# Patient Record
Sex: MALE | Race: WHITE | NOT HISPANIC OR LATINO | Employment: OTHER | URBAN - METROPOLITAN AREA
[De-identification: names, ages, dates, MRNs, and addresses within clinical notes are randomized per-mention and may not be internally consistent; named-entity substitution may affect disease eponyms.]

---

## 2022-06-03 ENCOUNTER — PATIENT OUTREACH (OUTPATIENT)
Dept: UROLOGY | Facility: CLINIC | Age: 82
End: 2022-06-03

## 2022-06-03 ENCOUNTER — TELEPHONE (OUTPATIENT)
Dept: UROLOGY | Facility: CLINIC | Age: 82
End: 2022-06-03

## 2022-06-03 NOTE — TELEPHONE ENCOUNTER
Pt called to discuss possible appt and urgency and records. No answer on mobile, no voicemail set up. Voicemail left on home phone with request for return call

## 2022-06-03 NOTE — TELEPHONE ENCOUNTER
[facility] Call Center    Phone Message    May a detailed message be left on voicemail: yes     Reason for Call: Patient is being sent from Dr. Beckwith at [facility] for HoLEP. Patient says unable to do a video visit at all. Dr. Castrejon does not see in person for this. Offered in person with Dr. Lay in September  but patient says he does not want to wait that long and needs to be seen this month. Please reach out to discuss options. Thank you!    Action Taken: Message routed to:  [facility] ([facility]    Travel Screening: Not Applicable

## 2022-06-03 NOTE — TELEPHONE ENCOUNTER
[facility] will update records and reach out to patient after urgency has been decided. As of now, we have no information to suggest urgent work-in.

## 2022-06-03 NOTE — TELEPHONE ENCOUNTER
Pt has been to ED four times in the month of June, is asking for a holep consult from [facility] urologist. Appt booked

## 2022-06-07 ENCOUNTER — PRE VISIT (OUTPATIENT)
Dept: UROLOGY | Facility: CLINIC | Age: 82
End: 2022-06-07

## 2022-06-07 NOTE — TELEPHONE ENCOUNTER
Action 2022 JTV 3:48 PM    Action Taken CSS called patient. Patient did not  and call went directly to [employer] Receive.     CSS sent an urgent request to MACKEY and Jean for images to be pushed.      Action 2022 JTV 8:41 AM   Action Taken CSS received and resolved images from Jean and [facility].         MEDICAL RECORDS REQUEST   [facility]  Urology Clinic  [address] [place] [id number]  PHONE: [phone]  Fax: [phone]        FUTURE VISIT INFORMATION                                                   Kehinde Portillo, : 1940 scheduled for future visit at [facility]    APPOINTMENT INFORMATION:    Date: 2022    Provider:  Isaiah Castrejon MD    Reason for Visit/Diagnosis: holep consult - aguayo in place      RECORDS REQUESTED FOR VISIT                                                     NOTES  STATUS/DETAILS   OFFICE NOTE from other specialist  yes, 2022 -- YUMIKO Kyle M.D.  -- [facility]    2022 -- Nemesio Molina M.D.  -- [facility]    2022, 2022 --  Jo-Ann De Paz P.A.-C., M.S.   DISCHARGE SUMMARY from hospital   yes, 2022 -- Tracey Easley MD -- [facility]      DISCHARGE REPORT from the ER   yes, 2022 -- Lionel Barbosa MD -- [facility]   2022 -- Gabi Parks MD-- [facility]   OPERATIVE REPORT  yes   MEDICATION LIST  yes   LABS     URINALYSIS (UA)  yes   IMAGING (IMAGES & REPORT)  yes, 2022 -- BLADDER Image -- [facility]  [id number] CT Urogram -- [facility]  2022, 2021 -- CT Chest -- [facility]  2022, 2021 -- CT ABD PELVIS -- [facility]    2022 -- US RENAL and Bladder -- [facility]   PSA (LAB)  yes     PRE-VISIT CHECKLIST      Record collection complete yes   Appointment appropriately scheduled           (right time/right provider) Yes   Joint diagnostic appointment coordinated correctly          (ensure right order & amount of time)  Yes   MyChart activation No   Questionnaire complete If no, please explain pending

## 2022-06-17 ENCOUNTER — PRE VISIT (OUTPATIENT)
Dept: UROLOGY | Facility: CLINIC | Age: 82
End: 2022-06-17

## 2022-06-17 NOTE — TELEPHONE ENCOUNTER
Reason for visit: Consult to discuss HoLEP     Dx/Hx/Sx: Urinary Retention / BPH    Records/imaging/labs/orders: In EPIC    At Rooming: standard (no AUA, patient has aguayo in place)

## 2022-06-21 ENCOUNTER — TELEPHONE (OUTPATIENT)
Dept: UROLOGY | Facility: CLINIC | Age: 82
End: 2022-06-21
Payer: MEDICARE

## 2022-06-21 ENCOUNTER — OFFICE VISIT (OUTPATIENT)
Dept: UROLOGY | Facility: CLINIC | Age: 82
End: 2022-06-21
Payer: MEDICARE

## 2022-06-21 VITALS
WEIGHT: 150 LBS | SYSTOLIC BLOOD PRESSURE: 123 MMHG | HEART RATE: 69 BPM | DIASTOLIC BLOOD PRESSURE: 73 MMHG | BODY MASS INDEX: 22.73 KG/M2 | HEIGHT: 68 IN

## 2022-06-21 DIAGNOSIS — R33.9 URINARY RETENTION: ICD-10-CM

## 2022-06-21 DIAGNOSIS — N39.498 OTHER URINARY INCONTINENCE: Primary | ICD-10-CM

## 2022-06-21 PROCEDURE — 51798 US URINE CAPACITY MEASURE: CPT | Performed by: UROLOGY

## 2022-06-21 PROCEDURE — 99205 OFFICE O/P NEW HI 60 MIN: CPT | Mod: 25 | Performed by: UROLOGY

## 2022-06-21 RX ORDER — EPLERENONE 50 MG/1
50 TABLET, FILM COATED ORAL DAILY
COMMUNITY
Start: 2022-05-21

## 2022-06-21 RX ORDER — AMLODIPINE BESYLATE 5 MG/1
5 TABLET ORAL DAILY
COMMUNITY
Start: 2022-05-21

## 2022-06-21 RX ORDER — AMOXICILLIN 500 MG/1
2000 CAPSULE ORAL ONCE
COMMUNITY
Start: 2022-03-15

## 2022-06-21 RX ORDER — TERAZOSIN 10 MG/1
CAPSULE ORAL
COMMUNITY
Start: 2022-05-21

## 2022-06-21 RX ORDER — DEXAMETHASONE 4 MG/1
TABLET ORAL
COMMUNITY
Start: 2022-04-06

## 2022-06-21 RX ORDER — MULTIVITAMIN,THER AND MINERALS
1 TABLET ORAL DAILY
COMMUNITY

## 2022-06-21 RX ORDER — FLUTICASONE PROPIONATE 50 MCG
2 SPRAY, SUSPENSION (ML) NASAL 2 TIMES DAILY
COMMUNITY

## 2022-06-21 RX ORDER — ATORVASTATIN CALCIUM 20 MG/1
TABLET, FILM COATED ORAL
COMMUNITY
Start: 2022-05-21

## 2022-06-21 RX ORDER — CIPROFLOXACIN AND DEXAMETHASONE 3; 1 MG/ML; MG/ML
SUSPENSION/ DROPS AURICULAR (OTIC)
Status: ON HOLD | COMMUNITY
Start: 2022-05-04 | End: 2022-09-13

## 2022-06-21 ASSESSMENT — PAIN SCALES - GENERAL: PAINLEVEL: NO PAIN (0)

## 2022-06-21 NOTE — LETTER
Date:June 27, 2022      Provider requested that no letter be sent. Do not send.       [facility]

## 2022-06-21 NOTE — LETTER
6/21/2022       RE: Kehinde Portillo  [address] [address] [facility] [address]     Dear Colleague,    Thank you for referring your patient, Kehinde Portillo, to the [facility] [place] [facility]. Please see a copy of my visit note below.          UROLOGY OUTPATIENT VISIT      Chief Complaint:   Urinary Retention      Synopsis    Kehinde Portillo is a very pleasant AGE: 82 year old year old person    He is being referred from [place] for consideration of HoLEP,  He underwent a cysto 2 months ago resulting in a UTI and hospitalization which led to retention  He is no longer in retention, he knows how to CIC but has not had to cath  Main symptoms at the moment are frequency and urgency  He voids 4x per night  He describes the stream as adequate    AUA Score today 30/4    He does have a history of CLL but this is well controlled     He is a very functional person, highly mobile and active  for age           Medications     Current Outpatient Medications   Medication     amLODIPine (NORVASC) 5 MG tablet     amoxicillin (AMOXIL) 500 MG capsule     atorvastatin (LIPITOR) 20 MG tablet     ciprofloxacin-dexamethasone (CIPRODEX) 0.3-0.1 % otic suspension     eplerenone (INSPRA) 50 MG tablet     FLOVENT  MCG/ACT inhaler     fluticasone (FLONASE) 50 MCG/ACT nasal spray     Multiple Vitamins-Minerals (SUPER THERA CARLY M) TABS     terazosin (HYTRIN) 10 MG capsule     vitamin D3 (CHOLECALCIFEROL) 125 MCG (5000 UT) tablet     No current facility-administered medications for this visit.         The following  distinct labs were reviewed    I personally reviewed all applicable laboratory data and went over findings with patient  Significant for:    6/17/22  Hgb 12.3    PLatelets 176    Creatinine 1    Ucx 5/23 enterococcus        Recent Imaging Report    I personally reviewed all applicable imaging and went over the below findings with  patient.    Renal US 5/2022  1.  Mild right hydronephrosis.   2.  Increased renal parenchymal echogenicity suggesting chronic medical renal disease.   3.  Mild right hydronephrosis.   4.  Bladder is decompressed with a Wallace catheter reducing evaluation for wall thickening.   5.  Bilateral renal cysts. No follow-up needed.      CTU 5/2022  1. Markedly enlarged heterogeneously enhancing prostate with secondary changes of bladder outlet   obstruction.   2. Tiny calyceal tip stone in the right kidney.   3. Mild abdominopelvic lymphadenopathy related to patient's history of chronic lymphocytic leukemia   stable to slightly improved since 2/24/2022. Stable mild splenomegaly.        Personal Review of Recent Imaging    I personally reviewed the above CT and based on my own interpretation prostate is 6.5 x 6.0 x 7 cm for estimated volume around 135 mL           Assessment/Plan   82 year old year old person with large gland BPH, hx of urinary retention  After discussion of medical and surgical treatments for BPH including alpha blockers, anticholinergics, MIST, PAE, transurethral resection, laser ablation, enucleation and simple prostatectomy, Mr. Portillo has decided to proceed with Holmium Laser Enucleation of the Prostate (HoLEP).    We discussed the associated risks of this procedure included but not limited to the following:  -Bleeding, potentially significant enough to require clot evacuation and blood transfusion  -Infection, for which we will plan to treat preoperatively based on targeted antibiotic therapy  -Damage to the bladder, urethra and penis including the risk of urethral stricture and bladder neck contracture  -Risk of incidentally discovered prostate cancer.  We discussed that this would not preclude him from further therapy though it could prolong recovery and potentially increase risk of complications associated with cancer treatment.  Further preoperative workup to assess for prostate cancer prior to  surgery was offered but patient deferred.  -Risk of retrograde ejaculation which would be expected to occur in the majority if not all men after HoLEP  -Risk of urinary incontinence.  We discussed that in the majority of men this is a transient process that is generally self limited to the first 6-12 weeks after surgery though could take longer to resolve depending on baseline bladder instability.  -Risk of postperative urinary retention though in published series HoLEP has been found to be associated with high success rates of achieving spontaneous voiding even in men with underactive and atonic bladders.  -We will proceed with preoperative clearance with preference to minimize all anticoagulation as deemed acceptable by his primary care provider.       Complex Medical Decision Making: Yes.  Immunocomprimised States (Cancer, Diabetes, Steroid Use, HIV/Aids)  Discussed with patient the higher potential risk of perioperative and post-procedural complications as a result of comorbid illnesses and   Discussed with patient the higher potential risk of prolonged post-operative recovery as a result of comorbid illnesses    CC:  No primary care provider on file.    >45 minutes spent exclusive to any listed procedure on the clinical encounter date doing chart review, history and exam, documentation and further activities as noted above            Again, thank you for allowing me to participate in the care of your patient.      Sincerely,    Isaiah Castrejon MD

## 2022-06-21 NOTE — NURSING NOTE
"Chief Complaint   Patient presents with     Consult     Discuss HoLEP       Blood pressure 123/73, pulse 69, height 1.727 m (5' 8\"), weight 68 kg (150 lb). Body mass index is 22.81 kg/m .    There is no problem list on file for this patient.      Allergies   Allergen Reactions     Aspirin Other (See Comments)     SOB  SOB         Current Outpatient Medications   Medication Sig Dispense Refill     amLODIPine (NORVASC) 5 MG tablet Take 5 mg by mouth daily       amoxicillin (AMOXIL) 500 MG capsule TAKE 4 CAPSULES BY MOUTH ONE HOUR PRIOR TO APPOINTMENT       atorvastatin (LIPITOR) 20 MG tablet TAKE 1 TABLET (20 MG) BY MOUTH ONCE DAILY WITH EVENING MEAL.       ciprofloxacin-dexamethasone (CIPRODEX) 0.3-0.1 % otic suspension INSTILL 4 DROPS INTO AFFECTED EAR 2 TIMES DAILY FOR 5 DAYS 7       eplerenone (INSPRA) 50 MG tablet Take 50 mg by mouth daily       FLOVENT  MCG/ACT inhaler TAKE 2 PUFFS BY MOUTH TWICE A DAY       fluticasone (FLONASE) 50 MCG/ACT nasal spray Spray 2 sprays in nostril       Multiple Vitamins-Minerals (SUPER THERA CARLY M) TABS Take 1 tablet by mouth       terazosin (HYTRIN) 10 MG capsule TAKE 1 CAPSULE (10 MG) BY MOUTH BEFORE BEDTIME.       vitamin D3 (CHOLECALCIFEROL) 125 MCG (5000 UT) tablet Take 5,000 Units by mouth         Social History     Tobacco Use     Smoking status: Never Smoker     Smokeless tobacco: Never Used       Fidencio Malone, EMT  6/21/2022  3:16 PM  "

## 2022-06-21 NOTE — PROGRESS NOTES
UROLOGY OUTPATIENT VISIT      Chief Complaint:   Urinary Retention      Synopsis    Kehinde Portillo is a very pleasant AGE: 82 year old year old person    He is being referred from [place] for consideration of HoLEP,  He underwent a cysto 2 months ago resulting in a UTI and hospitalization which led to retention  He is no longer in retention, he knows how to CIC but has not had to cath  Main symptoms at the moment are frequency and urgency  He voids 4x per night  He describes the stream as adequate    AUA Score today 30/4    He does have a history of CLL but this is well controlled     He is a very functional person, highly mobile and active  for age           Medications     Current Outpatient Medications   Medication     amLODIPine (NORVASC) 5 MG tablet     amoxicillin (AMOXIL) 500 MG capsule     atorvastatin (LIPITOR) 20 MG tablet     ciprofloxacin-dexamethasone (CIPRODEX) 0.3-0.1 % otic suspension     eplerenone (INSPRA) 50 MG tablet     FLOVENT  MCG/ACT inhaler     fluticasone (FLONASE) 50 MCG/ACT nasal spray     Multiple Vitamins-Minerals (SUPER THERA CARLY M) TABS     terazosin (HYTRIN) 10 MG capsule     vitamin D3 (CHOLECALCIFEROL) 125 MCG (5000 UT) tablet     No current facility-administered medications for this visit.         The following  distinct labs were reviewed    I personally reviewed all applicable laboratory data and went over findings with patient  Significant for:    6/17/22  Hgb 12.3    PLatelets 176    Creatinine 1    Ucx 5/23 enterococcus        Recent Imaging Report    I personally reviewed all applicable imaging and went over the below findings with patient.    Renal US 5/2022  1.  Mild right hydronephrosis.   2.  Increased renal parenchymal echogenicity suggesting chronic medical renal disease.   3.  Mild right hydronephrosis.   4.  Bladder is decompressed with a Wallace catheter reducing evaluation for wall thickening.   5.  Bilateral renal cysts. No follow-up needed.      CTU  5/2022  1. Markedly enlarged heterogeneously enhancing prostate with secondary changes of bladder outlet   obstruction.   2. Tiny calyceal tip stone in the right kidney.   3. Mild abdominopelvic lymphadenopathy related to patient's history of chronic lymphocytic leukemia   stable to slightly improved since 2/24/2022. Stable mild splenomegaly.        Personal Review of Recent Imaging    I personally reviewed the above CT and based on my own interpretation prostate is 6.5 x 6.0 x 7 cm for estimated volume around 135 mL           Assessment/Plan   82 year old year old person with large gland BPH, hx of urinary retention  After discussion of medical and surgical treatments for BPH including alpha blockers, anticholinergics, MIST, PAE, transurethral resection, laser ablation, enucleation and simple prostatectomy, Mr. Portillo has decided to proceed with Holmium Laser Enucleation of the Prostate (HoLEP).    We discussed the associated risks of this procedure included but not limited to the following:  -Bleeding, potentially significant enough to require clot evacuation and blood transfusion  -Infection, for which we will plan to treat preoperatively based on targeted antibiotic therapy  -Damage to the bladder, urethra and penis including the risk of urethral stricture and bladder neck contracture  -Risk of incidentally discovered prostate cancer.  We discussed that this would not preclude him from further therapy though it could prolong recovery and potentially increase risk of complications associated with cancer treatment.  Further preoperative workup to assess for prostate cancer prior to surgery was offered but patient deferred.  -Risk of retrograde ejaculation which would be expected to occur in the majority if not all men after HoLEP  -Risk of urinary incontinence.  We discussed that in the majority of men this is a transient process that is generally self limited to the first 6-12 weeks after surgery though could take  longer to resolve depending on baseline bladder instability.  -Risk of postperative urinary retention though in published series HoLEP has been found to be associated with high success rates of achieving spontaneous voiding even in men with underactive and atonic bladders.  -We will proceed with preoperative clearance with preference to minimize all anticoagulation as deemed acceptable by his primary care provider.       Complex Medical Decision Making: Yes.  Immunocomprimised States (Cancer, Diabetes, Steroid Use, HIV/Aids)  Discussed with patient the higher potential risk of perioperative and post-procedural complications as a result of comorbid illnesses and   Discussed with patient the higher potential risk of prolonged post-operative recovery as a result of comorbid illnesses    CC:  No primary care provider on file.    >45 minutes spent exclusive to any listed procedure on the clinical encounter date doing chart review, history and exam, documentation and further activities as noted above

## 2022-06-27 ENCOUNTER — TELEPHONE (OUTPATIENT)
Dept: UROLOGY | Facility: CLINIC | Age: 82
End: 2022-06-27

## 2022-06-28 ENCOUNTER — TELEPHONE (OUTPATIENT)
Dept: UROLOGY | Facility: CLINIC | Age: 82
End: 2022-06-28

## 2022-06-28 DIAGNOSIS — R33.9 URINARY RETENTION: Primary | ICD-10-CM

## 2022-06-28 NOTE — TELEPHONE ENCOUNTER
Spoke with: Patient and Patient wife      Date of surgery: Monday Sept 12 2022  730am      Location: [facility]       Informed patient they will need a adult : Yes      Pre op with provider: patient will set up pre op end of August with Dr Jose Armando Mullen at [facility]       H&P Scheduled in PAC- NA         Pre procedure covid : PCR covid Friday Sept 9th 2022 at  [facility]       Additional imaging: NA        Surgery Packet :Forward to Katie       Additional comments: Please call patient with surgery teaching.

## 2022-07-19 ENCOUNTER — THERAPY VISIT (OUTPATIENT)
Dept: PHYSICAL THERAPY | Facility: CLINIC | Age: 82
End: 2022-07-19
Attending: UROLOGY
Payer: MEDICARE

## 2022-07-19 DIAGNOSIS — N40.0 ENLARGED PROSTATE: ICD-10-CM

## 2022-07-19 DIAGNOSIS — N39.41 URGENCY INCONTINENCE: ICD-10-CM

## 2022-07-19 DIAGNOSIS — R35.0 URINARY FREQUENCY: ICD-10-CM

## 2022-07-19 PROCEDURE — 97535 SELF CARE MNGMENT TRAINING: CPT | Mod: GP | Performed by: PHYSICAL THERAPIST

## 2022-07-19 PROCEDURE — 97110 THERAPEUTIC EXERCISES: CPT | Mod: GP | Performed by: PHYSICAL THERAPIST

## 2022-07-19 PROCEDURE — 97161 PT EVAL LOW COMPLEX 20 MIN: CPT | Mod: GP | Performed by: PHYSICAL THERAPIST

## 2022-07-19 NOTE — PROGRESS NOTES
[facility]    OUTPATIENT Physical Therapy ORTHOPEDIC EVALUATION  PLAN OF TREATMENT FOR OUTPATIENT REHABILITATION  (COMPLETE FOR INITIAL CLAIMS ONLY)  Patient's Last Name, First Name, M.I.  YOB: 1940  Kehinde Portillo    Provider s Name:  [facility]   Medical Record No.  [id number]   Start of Care Date:  07/19/22   Onset Date:   06/21/22 (MD order for PT)   Type:     _X__PT   ___OT Medical Diagnosis:    Encounter Diagnoses   Name Primary?    Urinary frequency     Urgency incontinence     Enlarged prostate         Treatment Diagnosis:  enlarged prostate, urinary frequency/urgency, urge incontinence        Goals:     07/19/22 0700   Urinary Leakage   Previous Functional Level No problems   Current Functional Level Number of urinary leakage episodes in a month;Leaking with urge   Performance Level 2-3   STG Target Performance Decrease urinary leakage episodes in a month to   Performance Level 1   Rationale continence throughout the day   Due Date 08/30/22   LTG Target Performance Decrease urinary leakage episodes in a month to   Performance Level 0   Rationale continence throughout the day   Due Date 09/28/22   Voiding Patterns   Previous Functional Level No problems   Current Functional Level Times a day that urgency is experienced;Times during the night that patient voids   Peformance level day: every 1-3 hrs night: 3-4 voids   STG Target Performance Reduce times in a day urgency is experienced to;Reduce times a night that patient voids to    Performance Level day: every 1.5-3 hrs night: 2-3   Rationale to establish restorative sleep pattern;work toward normal voiding intervals to focus on ADLS, work, or school;continence throughout the day   Due Date 08/16/22   LTG Target Performance Reduce times in a day urgency is experienced to;Reduce times a night that patient  voids to    Performance Level day: 2-3 hrs night: 1-2   Rationale continence throughout the day;to establish restorative sleep pattern;work toward normal voiding intervals to focus on ADLS, work, or school   Due Date 09/13/22       Therapy Frequency:  1x weekly for 1 week then 1x monthly for  Predicted Duration of Therapy Intervention:  1 month    Lolis Bueno, PT                 I CERTIFY THE NEED FOR THESE SERVICES FURNISHED UNDER        THIS PLAN OF TREATMENT AND WHILE UNDER MY CARE     (Physician attestation of this document indicates review and certification of the therapy plan).                     Certification Date From:  07/19/22   Certification Date To:  10/16/22    Referring Provider:  Isaiah Castrejon    Initial Assessment        See Epic Evaluation SOC Date: 07/19/22

## 2022-07-19 NOTE — PROGRESS NOTES
Physical Therapy Initial Evaluation  Subjective:  The history is provided by the patient. No  was used.   Patient Health History  Kehinde A Suchy being seen for enlarged prostate, urinary frequency/urgency.     Date of Onset: 6/21/22 MD order for PT.   Problem occurred: reports having an enlarged prostate for many years . He had a cystoscopy in 5/2022 with an UTI following it which then required a catheter for 3 weeks. Currently patient reports frequent urination( 3 voids at night, every 1-3 hrs in day) occasional urge incontinence( 2-3x per month) Pt will have Holmium laser prostate surgery in 9/2022 and advised to do PT prior to learn to do the pelvic floor exercises.    Pain is reported as 0/10 on pain scale.  General health as reported by patient is good.  Pertinent medical history includes: cancer, high blood pressure, migraines/headaches and other (CLL).   Red flags:  None as reported by patient.  Medical allergies: other. Other medical allergies details: aspirin .   Surgeries include:  Orthopedic surgery. Other surgery history details: B tKA 2013 2016, appendix 1996, 2 hernia 2012.    Current medications:  High blood pressure medication.    Current occupation is rettired .                     Therapist Generated HPI Evaluation         Type of problem:  Other and incontinence (enlarged prostate, frequency/ urgency).    This is a chronic condition.  Condition occurred with:  Insidious onset.          Since onset symptoms are unchanged.  Symptoms are exacerbated by other (waiting too long to void increases urgency and occasional urge incontinence  )  and relieved by other (trying not to wait to long to void).      Barriers include:  None as reported by patient.                        Objective:  System                                 Pelvic Dysfunction Evaluation:    Bladder/Pelvic Problems:    Storage Problem:  Urgency, frequency, urge incontinence and nocturia  Emptying Problem:   Incomplete emptying and hesitancy      Diagnostic Tests:      UA/Urine Sample:  UTI 5/2022 following a cystoscopy         Cystoscopy:  5/2022              Abdominal Wall:  Abdominal wall pelvic: good TrA activation in supine with verbal cues.  Diastasis Recti:  Normal            External Assessment:  External assessment pelvic: strong PFMC observed in supine, 8-10 sec hold, repeateable with good relaxation phase and no substitution.  Skin Condition:  Normal      Tissue Symmetry:  Normal    Muscle Contraction/Perineal Mobility:  Elevation and urogential triangle descent    Additional History:        Caffeine Consumption:  16 oz                     General     ROS    Assessment/Plan:    Patient is a 82 year old male with pelvic complaints.    Patient has the following significant findings with corresponding treatment plan.                Diagnosis 1:  Enlarged prostate, urinary frequency/urgency, incontinence  Impaired muscle performance - neuro re-education and home program  Decreased function - therapeutic activities and home program    Therapy Evaluation Codes:   1) History comprised of:   Personal factors that impact the plan of care:      None.    Comorbidity factors that impact the plan of care are:      None.     Medications impacting care: None.  2) Examination of Body Systems comprised of:   Body structures and functions that impact the plan of care:      Pelvis.   Activity limitations that impact the plan of care are:      Frequency, Urgency and Urinary incontinence.  3) Clinical presentation characteristics are:   Stable/Uncomplicated.  4) Decision-Making    Low complexity using standardized patient assessment instrument and/or measureable assessment of functional outcome.  Cumulative Therapy Evaluation is: Low complexity.    Previous and current functional limitations:  (See Goal Flow Sheet for this information)    Short term and Long term goals: (See Goal Flow Sheet for this information)     Communication  ability:  Patient appears to be able to clearly communicate and understand verbal and written communication and follow directions correctly.  Treatment Explanation - The following has been discussed with the patient:   RX ordered/plan of care  Anticipated outcomes  Possible risks and side effects  This patient would benefit from PT intervention to resume normal activities.   Rehab potential is good.    Frequency:  1 X week, once daily  Duration:  for 1 weeks then 1x month for 1 month  Discharge Plan:  Achieve all LTG.  Independent in home treatment program.  Reach maximal therapeutic benefit.    Please refer to the daily flowsheet for treatment today, total treatment time and time spent performing 1:1 timed codes.

## 2022-08-15 ENCOUNTER — THERAPY VISIT (OUTPATIENT)
Dept: PHYSICAL THERAPY | Facility: CLINIC | Age: 82
End: 2022-08-15
Payer: MEDICARE

## 2022-08-15 DIAGNOSIS — R35.0 URINARY FREQUENCY: Primary | ICD-10-CM

## 2022-08-15 DIAGNOSIS — N40.0 ENLARGED PROSTATE: ICD-10-CM

## 2022-08-15 DIAGNOSIS — N39.41 URGENCY INCONTINENCE: ICD-10-CM

## 2022-08-15 PROCEDURE — 97110 THERAPEUTIC EXERCISES: CPT | Mod: GP | Performed by: PHYSICAL THERAPIST

## 2022-08-15 PROCEDURE — 97530 THERAPEUTIC ACTIVITIES: CPT | Mod: GP | Performed by: PHYSICAL THERAPIST

## 2022-08-16 ENCOUNTER — PATIENT OUTREACH (OUTPATIENT)
Dept: UROLOGY | Facility: CLINIC | Age: 82
End: 2022-08-16

## 2022-08-16 DIAGNOSIS — Z11.59 ENCOUNTER FOR SCREENING FOR OTHER VIRAL DISEASES: ICD-10-CM

## 2022-08-16 DIAGNOSIS — R33.9 URINARY RETENTION: Primary | ICD-10-CM

## 2022-08-16 NOTE — TELEPHONE ENCOUNTER
Pt has pre-op and covid scheduled, will need additional UC lab scheduled    Left detailed voicemail to return call to discuss upcoming surgery

## 2022-08-17 ENCOUNTER — TELEPHONE (OUTPATIENT)
Dept: UROLOGY | Facility: CLINIC | Age: 82
End: 2022-08-17

## 2022-08-17 NOTE — TELEPHONE ENCOUNTER
----- Message from Irma Rasheed RN sent at 8/17/2022  2:32 PM CDT -----  Please fax covid order to [facility] at fax number [phone]    Thanks!

## 2022-08-17 NOTE — TELEPHONE ENCOUNTER
Pt called and notified of the below. Pt expressed understanding.    Sarah Telles CMA  08/17/22  4:04 PM          ----- Message from Irma Rasheed RN sent at 8/17/2022  2:47 PM CDT -----  Would you call this patient and let him know that [facility] in [place] said he can add his covid test on to his already scheduled lab tests and that we have faxed an order over to them     Thanks  Irma

## 2022-08-17 NOTE — TELEPHONE ENCOUNTER
Action 08/17/22 MMT   Action Taken  CSS faxed orders to [facility] at [phone] per pt request.

## 2022-08-31 ENCOUNTER — TELEPHONE (OUTPATIENT)
Dept: UROLOGY | Facility: CLINIC | Age: 82
End: 2022-08-31

## 2022-08-31 NOTE — TELEPHONE ENCOUNTER
Pre Op Teaching Flowsheet       Pre and Post op Patient Education  Relevant Diagnosis:  bph   Surgical procedure:  holep  Teaching Topic:  Pre and post op teaching  Person Involved in teaching: Yes    Motivation Level:  Asks Questions: Yes  Eager to Learn: Yes  Cooperative: Yes  Receptive (willing/able to accept information):  Yes    Patient demonstrates understanding of the following:  Date of surgery:  9/12  Location of surgery:  3rd [facility]  History and Physical and any other testing necessary prior to surgery: Yes  Required time line for completion of History and Physical and any pre-op testing: Yes    Patient demonstrates understanding of the following:  Pre-op bowel prep:  N/A  Pre-op showering/scrub information with PCMX Soap: Yes  Blood thinner medications discussed and when to stop (if applicable):  Yes  Discussed no visitor's at this time due to increase Covid-19 cases and how we need to make sure everyone stays safe.    Infection Prevention:   Patient demonstrates understanding of the following:  Surgical procedure site care taught: Yes  Signs and symptoms of infection taught: Yes      Post-op follow-up:  Discussed how to contact the hospital, nurse, and clinic scheduling staff if necessary. (See packet information)    Instructional materials used/given/mailed:  [place] [facility] Packet, post op teaching sheet, Map, Soap, and with the arrival/location information to come closer to the surgery date.    Surgical instructions packet given to patient in office:  N/A    Follow up: Discussed arranging for someone to drive you home. ( No public transportation)  Someone needed to stay the first twenty hours after surgery: Yes     referral: no     home:  yes    Care Giver:  yes    PCP:  yes

## 2022-09-08 ENCOUNTER — TELEPHONE (OUTPATIENT)
Dept: UROLOGY | Facility: CLINIC | Age: 82
End: 2022-09-08

## 2022-09-08 NOTE — TELEPHONE ENCOUNTER
Care Everywhere updated.    Message routed to RN CC    Results are:    CULTURE  <10,000 CFU/mL multiple organisms    Resulting Agency [facility]   Specimen Collected: 09/06/22 12:10 PM Last Resulted: 09/07/22  5:09 PM   Received From: [employer] [facility]  Result Received: 09/08/22  9:31 AM     Sarah Telles CMA  09/08/22  12:08 PM      ----- Message from Irma Rasheed RN sent at 9/6/2022  9:47 AM CDT -----  Regarding: UC  Please check for neg UC about 10 days out. If pos, please send note to myself    Irma

## 2022-09-09 ENCOUNTER — ANESTHESIA EVENT (OUTPATIENT)
Dept: SURGERY | Facility: CLINIC | Age: 82
End: 2022-09-09
Payer: MEDICARE

## 2022-09-10 ENCOUNTER — LAB (OUTPATIENT)
Dept: LAB | Facility: CLINIC | Age: 82
End: 2022-09-10
Payer: MEDICARE

## 2022-09-10 DIAGNOSIS — Z11.59 ENCOUNTER FOR SCREENING FOR OTHER VIRAL DISEASES: ICD-10-CM

## 2022-09-10 DIAGNOSIS — R33.9 URINARY RETENTION: ICD-10-CM

## 2022-09-10 PROCEDURE — U0003 INFECTIOUS AGENT DETECTION BY NUCLEIC ACID (DNA OR RNA); SEVERE ACUTE RESPIRATORY SYNDROME CORONAVIRUS 2 (SARS-COV-2) (CORONAVIRUS DISEASE [COVID-19]), AMPLIFIED PROBE TECHNIQUE, MAKING USE OF HIGH THROUGHPUT TECHNOLOGIES AS DESCRIBED BY CMS-2020-01-R: HCPCS

## 2022-09-10 PROCEDURE — U0005 INFEC AGEN DETEC AMPLI PROBE: HCPCS

## 2022-09-11 LAB — SARS-COV-2 RNA RESP QL NAA+PROBE: NEGATIVE

## 2022-09-12 ENCOUNTER — ANESTHESIA (OUTPATIENT)
Dept: SURGERY | Facility: CLINIC | Age: 82
End: 2022-09-12
Payer: MEDICARE

## 2022-09-12 ENCOUNTER — HOSPITAL ENCOUNTER (OUTPATIENT)
Facility: CLINIC | Age: 82
Discharge: HOME | End: 2022-09-13
Attending: UROLOGY | Admitting: UROLOGY
Payer: MEDICARE

## 2022-09-12 DIAGNOSIS — N40.0 ENLARGED PROSTATE: Primary | ICD-10-CM

## 2022-09-12 PROBLEM — N13.8 BPH WITH OBSTRUCTION/LOWER URINARY TRACT SYMPTOMS: Status: ACTIVE | Noted: 2022-09-12

## 2022-09-12 PROBLEM — N40.1 BPH WITH OBSTRUCTION/LOWER URINARY TRACT SYMPTOMS: Status: ACTIVE | Noted: 2022-09-12

## 2022-09-12 LAB
ABO/RH(D): NORMAL
ANTIBODY SCREEN: NEGATIVE
CREAT SERPL-MCNC: 1.04 MG/DL (ref 0.66–1.25)
GFR SERPL CREATININE-BSD FRML MDRD: 72 ML/MIN/1.73M2
GLUCOSE BLDC GLUCOMTR-MCNC: 156 MG/DL (ref 70–99)
GLUCOSE BLDC GLUCOMTR-MCNC: 99 MG/DL (ref 70–99)
HOLD SPECIMEN: NORMAL
SPECIMEN EXPIRATION DATE: NORMAL

## 2022-09-12 PROCEDURE — 258N000001 HC RX 258: Performed by: UROLOGY

## 2022-09-12 PROCEDURE — 250N000025 HC SEVOFLURANE, PER MIN: Performed by: UROLOGY

## 2022-09-12 PROCEDURE — 710N000012 HC RECOVERY PHASE 2, PER MINUTE: Performed by: UROLOGY

## 2022-09-12 PROCEDURE — 250N000009 HC RX 250: Performed by: NURSE ANESTHETIST, CERTIFIED REGISTERED

## 2022-09-12 PROCEDURE — 36415 COLL VENOUS BLD VENIPUNCTURE: CPT | Performed by: UROLOGY

## 2022-09-12 PROCEDURE — 82962 GLUCOSE BLOOD TEST: CPT

## 2022-09-12 PROCEDURE — 370N000017 HC ANESTHESIA TECHNICAL FEE, PER MIN: Performed by: UROLOGY

## 2022-09-12 PROCEDURE — 82565 ASSAY OF CREATININE: CPT | Performed by: UROLOGY

## 2022-09-12 PROCEDURE — 360N000077 HC SURGERY LEVEL 4, PER MIN: Performed by: UROLOGY

## 2022-09-12 PROCEDURE — C1758 CATHETER, URETERAL: HCPCS | Performed by: UROLOGY

## 2022-09-12 PROCEDURE — 52649 PROSTATE LASER ENUCLEATION: CPT | Mod: GC | Performed by: UROLOGY

## 2022-09-12 PROCEDURE — 250N000013 HC RX MED GY IP 250 OP 250 PS 637: Performed by: ANESTHESIOLOGY

## 2022-09-12 PROCEDURE — 710N000010 HC RECOVERY PHASE 1, LEVEL 2, PER MIN: Performed by: UROLOGY

## 2022-09-12 PROCEDURE — 258N000003 HC RX IP 258 OP 636: Performed by: NURSE ANESTHETIST, CERTIFIED REGISTERED

## 2022-09-12 PROCEDURE — 88305 TISSUE EXAM BY PATHOLOGIST: CPT | Mod: TC | Performed by: UROLOGY

## 2022-09-12 PROCEDURE — 258N000003 HC RX IP 258 OP 636: Performed by: UROLOGY

## 2022-09-12 PROCEDURE — 999N000141 HC STATISTIC PRE-PROCEDURE NURSING ASSESSMENT: Performed by: UROLOGY

## 2022-09-12 PROCEDURE — 250N000013 HC RX MED GY IP 250 OP 250 PS 637: Performed by: UROLOGY

## 2022-09-12 PROCEDURE — 250N000011 HC RX IP 250 OP 636: Performed by: NURSE ANESTHETIST, CERTIFIED REGISTERED

## 2022-09-12 PROCEDURE — 86901 BLOOD TYPING SEROLOGIC RH(D): CPT | Performed by: UROLOGY

## 2022-09-12 PROCEDURE — 250N000009 HC RX 250: Performed by: UROLOGY

## 2022-09-12 PROCEDURE — 272N000001 HC OR GENERAL SUPPLY STERILE: Performed by: UROLOGY

## 2022-09-12 PROCEDURE — 250N000011 HC RX IP 250 OP 636: Performed by: UROLOGY

## 2022-09-12 RX ORDER — ACETAMINOPHEN 325 MG/1
975 TABLET ORAL ONCE
Status: COMPLETED | OUTPATIENT
Start: 2022-09-12 | End: 2022-09-12

## 2022-09-12 RX ORDER — NALOXONE HYDROCHLORIDE 0.4 MG/ML
0.4 INJECTION, SOLUTION INTRAMUSCULAR; INTRAVENOUS; SUBCUTANEOUS
Status: DISCONTINUED | OUTPATIENT
Start: 2022-09-12 | End: 2022-09-12 | Stop reason: HOSPADM

## 2022-09-12 RX ORDER — DEXAMETHASONE SODIUM PHOSPHATE 4 MG/ML
INJECTION, SOLUTION INTRA-ARTICULAR; INTRALESIONAL; INTRAMUSCULAR; INTRAVENOUS; SOFT TISSUE PRN
Status: DISCONTINUED | OUTPATIENT
Start: 2022-09-12 | End: 2022-09-12

## 2022-09-12 RX ORDER — NALOXONE HYDROCHLORIDE 0.4 MG/ML
0.4 INJECTION, SOLUTION INTRAMUSCULAR; INTRAVENOUS; SUBCUTANEOUS
Status: DISCONTINUED | OUTPATIENT
Start: 2022-09-12 | End: 2022-09-13 | Stop reason: HOSPADM

## 2022-09-12 RX ORDER — ONDANSETRON 4 MG/1
4 TABLET, ORALLY DISINTEGRATING ORAL EVERY 6 HOURS PRN
Status: DISCONTINUED | OUTPATIENT
Start: 2022-09-12 | End: 2022-09-13 | Stop reason: HOSPADM

## 2022-09-12 RX ORDER — OXYCODONE HYDROCHLORIDE 5 MG/1
5 TABLET ORAL EVERY 4 HOURS PRN
Status: DISCONTINUED | OUTPATIENT
Start: 2022-09-12 | End: 2022-09-12 | Stop reason: HOSPADM

## 2022-09-12 RX ORDER — CEPHALEXIN 500 MG/1
500 CAPSULE ORAL 2 TIMES DAILY
Qty: 10 CAPSULE | Refills: 0 | Status: SHIPPED | OUTPATIENT
Start: 2022-09-12

## 2022-09-12 RX ORDER — ONDANSETRON 2 MG/ML
4 INJECTION INTRAMUSCULAR; INTRAVENOUS EVERY 30 MIN PRN
Status: DISCONTINUED | OUTPATIENT
Start: 2022-09-12 | End: 2022-09-12 | Stop reason: HOSPADM

## 2022-09-12 RX ORDER — FENTANYL CITRATE-0.9 % NACL/PF 10 MCG/ML
PLASTIC BAG, INJECTION (ML) INTRAVENOUS PRN
Status: DISCONTINUED | OUTPATIENT
Start: 2022-09-12 | End: 2022-09-12

## 2022-09-12 RX ORDER — AMLODIPINE BESYLATE 2.5 MG/1
5 TABLET ORAL DAILY
Status: DISCONTINUED | OUTPATIENT
Start: 2022-09-13 | End: 2022-09-13 | Stop reason: HOSPADM

## 2022-09-12 RX ORDER — NALOXONE HYDROCHLORIDE 0.4 MG/ML
0.2 INJECTION, SOLUTION INTRAMUSCULAR; INTRAVENOUS; SUBCUTANEOUS
Status: DISCONTINUED | OUTPATIENT
Start: 2022-09-12 | End: 2022-09-12 | Stop reason: HOSPADM

## 2022-09-12 RX ORDER — NALOXONE HYDROCHLORIDE 0.4 MG/ML
0.2 INJECTION, SOLUTION INTRAMUSCULAR; INTRAVENOUS; SUBCUTANEOUS
Status: DISCONTINUED | OUTPATIENT
Start: 2022-09-12 | End: 2022-09-13 | Stop reason: HOSPADM

## 2022-09-12 RX ORDER — VANCOMYCIN HYDROCHLORIDE 1 G/200ML
1000 INJECTION, SOLUTION INTRAVENOUS EVERY 12 HOURS
Status: DISCONTINUED | OUTPATIENT
Start: 2022-09-12 | End: 2022-09-12 | Stop reason: HOSPADM

## 2022-09-12 RX ORDER — EPLERENONE 25 MG/1
50 TABLET, FILM COATED ORAL DAILY
Status: DISCONTINUED | OUTPATIENT
Start: 2022-09-13 | End: 2022-09-13 | Stop reason: HOSPADM

## 2022-09-12 RX ORDER — FUROSEMIDE 10 MG/ML
INJECTION INTRAMUSCULAR; INTRAVENOUS PRN
Status: DISCONTINUED | OUTPATIENT
Start: 2022-09-12 | End: 2022-09-12

## 2022-09-12 RX ORDER — LIDOCAINE 40 MG/G
CREAM TOPICAL
Status: DISCONTINUED | OUTPATIENT
Start: 2022-09-12 | End: 2022-09-12 | Stop reason: HOSPADM

## 2022-09-12 RX ORDER — ONDANSETRON 2 MG/ML
4 INJECTION INTRAMUSCULAR; INTRAVENOUS EVERY 6 HOURS PRN
Status: DISCONTINUED | OUTPATIENT
Start: 2022-09-12 | End: 2022-09-13 | Stop reason: HOSPADM

## 2022-09-12 RX ORDER — HYDROMORPHONE HCL IN WATER/PF 6 MG/30 ML
0.2 PATIENT CONTROLLED ANALGESIA SYRINGE INTRAVENOUS
Status: DISCONTINUED | OUTPATIENT
Start: 2022-09-12 | End: 2022-09-13 | Stop reason: HOSPADM

## 2022-09-12 RX ORDER — ATORVASTATIN CALCIUM 20 MG/1
20 TABLET, FILM COATED ORAL EVERY EVENING
Status: DISCONTINUED | OUTPATIENT
Start: 2022-09-12 | End: 2022-09-13 | Stop reason: HOSPADM

## 2022-09-12 RX ORDER — ONDANSETRON 2 MG/ML
INJECTION INTRAMUSCULAR; INTRAVENOUS PRN
Status: DISCONTINUED | OUTPATIENT
Start: 2022-09-12 | End: 2022-09-12

## 2022-09-12 RX ORDER — LIDOCAINE 40 MG/G
CREAM TOPICAL
Status: DISCONTINUED | OUTPATIENT
Start: 2022-09-12 | End: 2022-09-13 | Stop reason: HOSPADM

## 2022-09-12 RX ORDER — HYDROMORPHONE HYDROCHLORIDE 1 MG/ML
0.2 INJECTION, SOLUTION INTRAMUSCULAR; INTRAVENOUS; SUBCUTANEOUS EVERY 5 MIN PRN
Status: DISCONTINUED | OUTPATIENT
Start: 2022-09-12 | End: 2022-09-12 | Stop reason: HOSPADM

## 2022-09-12 RX ORDER — FENTANYL CITRATE 50 UG/ML
INJECTION, SOLUTION INTRAMUSCULAR; INTRAVENOUS PRN
Status: DISCONTINUED | OUTPATIENT
Start: 2022-09-12 | End: 2022-09-12

## 2022-09-12 RX ORDER — PROPOFOL 10 MG/ML
INJECTION, EMULSION INTRAVENOUS PRN
Status: DISCONTINUED | OUTPATIENT
Start: 2022-09-12 | End: 2022-09-12

## 2022-09-12 RX ORDER — ULTRASOUND COUPLING MEDIUM
GEL (GRAM) TOPICAL PRN
Status: DISCONTINUED | OUTPATIENT
Start: 2022-09-12 | End: 2022-09-12 | Stop reason: HOSPADM

## 2022-09-12 RX ORDER — SODIUM CHLORIDE, SODIUM LACTATE, POTASSIUM CHLORIDE, CALCIUM CHLORIDE 600; 310; 30; 20 MG/100ML; MG/100ML; MG/100ML; MG/100ML
INJECTION, SOLUTION INTRAVENOUS CONTINUOUS
Status: DISCONTINUED | OUTPATIENT
Start: 2022-09-12 | End: 2022-09-12 | Stop reason: HOSPADM

## 2022-09-12 RX ORDER — FENTANYL CITRATE 50 UG/ML
25 INJECTION, SOLUTION INTRAMUSCULAR; INTRAVENOUS EVERY 5 MIN PRN
Status: DISCONTINUED | OUTPATIENT
Start: 2022-09-12 | End: 2022-09-12 | Stop reason: HOSPADM

## 2022-09-12 RX ORDER — EPHEDRINE SULFATE 50 MG/ML
INJECTION, SOLUTION INTRAMUSCULAR; INTRAVENOUS; SUBCUTANEOUS PRN
Status: DISCONTINUED | OUTPATIENT
Start: 2022-09-12 | End: 2022-09-12

## 2022-09-12 RX ORDER — HYDROMORPHONE HCL IN WATER/PF 6 MG/30 ML
0.4 PATIENT CONTROLLED ANALGESIA SYRINGE INTRAVENOUS
Status: DISCONTINUED | OUTPATIENT
Start: 2022-09-12 | End: 2022-09-13 | Stop reason: HOSPADM

## 2022-09-12 RX ORDER — FLUTICASONE PROPIONATE 50 MCG
2 SPRAY, SUSPENSION (ML) NASAL DAILY PRN
Status: DISCONTINUED | OUTPATIENT
Start: 2022-09-12 | End: 2022-09-13 | Stop reason: HOSPADM

## 2022-09-12 RX ORDER — LIDOCAINE HYDROCHLORIDE 20 MG/ML
INJECTION, SOLUTION INFILTRATION; PERINEURAL PRN
Status: DISCONTINUED | OUTPATIENT
Start: 2022-09-12 | End: 2022-09-12

## 2022-09-12 RX ORDER — SODIUM CHLORIDE, SODIUM LACTATE, POTASSIUM CHLORIDE, CALCIUM CHLORIDE 600; 310; 30; 20 MG/100ML; MG/100ML; MG/100ML; MG/100ML
INJECTION, SOLUTION INTRAVENOUS CONTINUOUS
Status: DISCONTINUED | OUTPATIENT
Start: 2022-09-12 | End: 2022-09-13

## 2022-09-12 RX ORDER — SODIUM CHLORIDE, SODIUM LACTATE, POTASSIUM CHLORIDE, CALCIUM CHLORIDE 600; 310; 30; 20 MG/100ML; MG/100ML; MG/100ML; MG/100ML
INJECTION, SOLUTION INTRAVENOUS CONTINUOUS PRN
Status: DISCONTINUED | OUTPATIENT
Start: 2022-09-12 | End: 2022-09-12

## 2022-09-12 RX ORDER — ACETAMINOPHEN 325 MG/1
650 TABLET ORAL EVERY 6 HOURS
Status: DISCONTINUED | OUTPATIENT
Start: 2022-09-12 | End: 2022-09-13 | Stop reason: HOSPADM

## 2022-09-12 RX ORDER — ONDANSETRON 4 MG/1
4 TABLET, ORALLY DISINTEGRATING ORAL EVERY 30 MIN PRN
Status: DISCONTINUED | OUTPATIENT
Start: 2022-09-12 | End: 2022-09-12 | Stop reason: HOSPADM

## 2022-09-12 RX ORDER — VANCOMYCIN HYDROCHLORIDE 1 G/200ML
1000 INJECTION, SOLUTION INTRAVENOUS EVERY 24 HOURS
Status: DISCONTINUED | OUTPATIENT
Start: 2022-09-13 | End: 2022-09-13 | Stop reason: HOSPADM

## 2022-09-12 RX ADMIN — LIDOCAINE HYDROCHLORIDE 100 MG: 20 INJECTION, SOLUTION INFILTRATION; PERINEURAL at 09:18

## 2022-09-12 RX ADMIN — SODIUM CHLORIDE, POTASSIUM CHLORIDE, SODIUM LACTATE AND CALCIUM CHLORIDE: 600; 310; 30; 20 INJECTION, SOLUTION INTRAVENOUS at 10:40

## 2022-09-12 RX ADMIN — Medication 10 MG: at 09:30

## 2022-09-12 RX ADMIN — SODIUM CHLORIDE, POTASSIUM CHLORIDE, SODIUM LACTATE AND CALCIUM CHLORIDE: 600; 310; 30; 20 INJECTION, SOLUTION INTRAVENOUS at 17:12

## 2022-09-12 RX ADMIN — FUROSEMIDE 20 MG: 10 INJECTION, SOLUTION INTRAVENOUS at 11:25

## 2022-09-12 RX ADMIN — FENTANYL CITRATE 25 MCG: 50 INJECTION, SOLUTION INTRAMUSCULAR; INTRAVENOUS at 09:55

## 2022-09-12 RX ADMIN — FENTANYL CITRATE 25 MCG: 50 INJECTION, SOLUTION INTRAMUSCULAR; INTRAVENOUS at 11:28

## 2022-09-12 RX ADMIN — PHENYLEPHRINE HYDROCHLORIDE 0.2 MCG/KG/MIN: 10 INJECTION INTRAVENOUS at 10:40

## 2022-09-12 RX ADMIN — PROPOFOL 200 MG: 10 INJECTION, EMULSION INTRAVENOUS at 09:18

## 2022-09-12 RX ADMIN — ATORVASTATIN CALCIUM 20 MG: 20 TABLET, FILM COATED ORAL at 22:54

## 2022-09-12 RX ADMIN — Medication 50 MCG: at 10:24

## 2022-09-12 RX ADMIN — Medication 10 MG: at 10:33

## 2022-09-12 RX ADMIN — Medication 5 MG: at 09:58

## 2022-09-12 RX ADMIN — Medication 100 MCG: at 10:28

## 2022-09-12 RX ADMIN — PROPOFOL 40 MG: 10 INJECTION, EMULSION INTRAVENOUS at 10:05

## 2022-09-12 RX ADMIN — SODIUM CHLORIDE, POTASSIUM CHLORIDE, SODIUM LACTATE AND CALCIUM CHLORIDE: 600; 310; 30; 20 INJECTION, SOLUTION INTRAVENOUS at 09:05

## 2022-09-12 RX ADMIN — Medication 5 MG: at 09:28

## 2022-09-12 RX ADMIN — FENTANYL CITRATE 25 MCG: 50 INJECTION, SOLUTION INTRAMUSCULAR; INTRAVENOUS at 10:10

## 2022-09-12 RX ADMIN — ACETAMINOPHEN 650 MG: 325 TABLET, FILM COATED ORAL at 22:54

## 2022-09-12 RX ADMIN — Medication 5 MG: at 10:01

## 2022-09-12 RX ADMIN — Medication 5 MG: at 09:46

## 2022-09-12 RX ADMIN — FENTANYL CITRATE 25 MCG: 50 INJECTION, SOLUTION INTRAMUSCULAR; INTRAVENOUS at 09:30

## 2022-09-12 RX ADMIN — VANCOMYCIN HYDROCHLORIDE 1000 MG: 1 INJECTION, SOLUTION INTRAVENOUS at 08:30

## 2022-09-12 RX ADMIN — SODIUM CHLORIDE: 900 IRRIGANT IRRIGATION at 18:27

## 2022-09-12 RX ADMIN — Medication 50 MCG: at 10:18

## 2022-09-12 RX ADMIN — Medication 5 MG: at 09:37

## 2022-09-12 RX ADMIN — Medication 5 MG: at 10:13

## 2022-09-12 RX ADMIN — ACETAMINOPHEN 975 MG: 325 TABLET, FILM COATED ORAL at 08:14

## 2022-09-12 RX ADMIN — FENTANYL CITRATE 25 MCG: 50 INJECTION, SOLUTION INTRAMUSCULAR; INTRAVENOUS at 09:32

## 2022-09-12 RX ADMIN — Medication 10 MG: at 09:33

## 2022-09-12 RX ADMIN — Medication 100 MCG: at 10:37

## 2022-09-12 RX ADMIN — ACETAMINOPHEN 650 MG: 325 TABLET, FILM COATED ORAL at 17:09

## 2022-09-12 RX ADMIN — DEXAMETHASONE SODIUM PHOSPHATE 4 MG: 4 INJECTION, SOLUTION INTRAMUSCULAR; INTRAVENOUS at 09:36

## 2022-09-12 RX ADMIN — FENTANYL CITRATE 25 MCG: 50 INJECTION, SOLUTION INTRAMUSCULAR; INTRAVENOUS at 11:01

## 2022-09-12 RX ADMIN — FENTANYL CITRATE 25 MCG: 50 INJECTION, SOLUTION INTRAMUSCULAR; INTRAVENOUS at 10:05

## 2022-09-12 RX ADMIN — FENTANYL CITRATE 25 MCG: 50 INJECTION, SOLUTION INTRAMUSCULAR; INTRAVENOUS at 11:12

## 2022-09-12 RX ADMIN — Medication 10 MG: at 10:35

## 2022-09-12 RX ADMIN — GENTAMICIN SULFATE 280 MG: 40 INJECTION, SOLUTION INTRAMUSCULAR; INTRAVENOUS at 09:33

## 2022-09-12 RX ADMIN — ONDANSETRON 4 MG: 2 INJECTION INTRAMUSCULAR; INTRAVENOUS at 10:58

## 2022-09-12 ASSESSMENT — ACTIVITIES OF DAILY LIVING (ADL)
ADLS_ACUITY_SCORE: 35
ADLS_ACUITY_SCORE: 33
ADLS_ACUITY_SCORE: 33
ADLS_ACUITY_SCORE: 35
ADLS_ACUITY_SCORE: 33
ADLS_ACUITY_SCORE: 33
ADLS_ACUITY_SCORE: 35
ADLS_ACUITY_SCORE: 35

## 2022-09-12 NOTE — ANESTHESIA PROCEDURE NOTES
Airway       Patient location during procedure: OR       Procedure Start/Stop Times: 9/12/2022 9:20 AM  Staff -        Anesthesiologist:  Sabas Solorio MD       CRNA: Karina Thompson APRN CRNA       Other Anesthesia Staff: Andree Stewart       Performed By: SRNA  Consent for Airway        Urgency: elective  Indications and Patient Condition       Indications for airway management: emili-procedural       Induction type:intravenous       Mask difficulty assessment: 0 - not attempted    Final Airway Details       Final airway type: supraglottic airway    Supraglottic Airway Details        Type: LMA       Brand: Ambu AuraGain       LMA size: 5    Post intubation assessment        Placement verified by: capnometry, equal breath sounds and chest rise        Number of attempts at approach: 1       Number of other approaches attempted: 0       Secured with: silk tape       Ease of procedure: easy       Dentition: Intact and Unchanged    Medication(s) Administered   Medication Administration Time: 9/12/2022 9:20 AM

## 2022-09-12 NOTE — BRIEF OP NOTE
[facility]    Brief Operative Note    Pre-operative diagnosis: Urinary retention [R33.9]  Post-operative diagnosis Same as pre-operative diagnosis    Procedure: Procedure(s):  Holmium Laser Enucleation of the Prostate  Surgeon: Surgeon(s) and Role:     * Isaiah Castrejon MD - Primary     * Melvin Reza MD - Resident - Assisting  Anesthesia: Choice   Estimated Blood Loss: 100 mL    Drains: 22 Fr 3-way to CBI on traction  Specimens:   ID Type Source Tests Collected by Time Destination   1 :  Tissue Prostate SURGICAL PATHOLOGY EXAM Isaiah Castrejon MD 9/12/2022 10:56 AM      Findings:   None.  Complications: None.  Implants: * No implants in log *    - Keep catheter secured to abdomen for 1 hour in PACU (until 1pm), then can secure to leg w/ Stat-lock  - Urology will evaluate at 2pm (1 hr after catheter has been removed from traction) to determine plan (Admission vs discharge)  - Keep CBI running    Melvin Reza MD  Urology Resident

## 2022-09-12 NOTE — ANESTHESIA POSTPROCEDURE EVALUATION
Patient: Kehinde Portillo    Procedure: Procedure(s):  Holmium Laser Enucleation of the Prostate       Anesthesia Type:  General    Note:  Disposition: Inpatient   Postop Pain Control: Uneventful            Sign Out: Well controlled pain   PONV: No   Neuro/Psych: Uneventful            Sign Out: Acceptable/Baseline neuro status   Airway/Respiratory: Uneventful            Sign Out: Acceptable/Baseline resp. status   CV/Hemodynamics: Uneventful            Sign Out: Acceptable CV status; No obvious hypovolemia; No obvious fluid overload   Other NRE: NONE   DID A NON-ROUTINE EVENT OCCUR? No           Last vitals:  Vitals Value Taken Time   /75 09/12/22 1415   Temp 36.7  C (98.1  F) 09/12/22 1200   Pulse 74 09/12/22 1434   Resp 7 09/12/22 1434   SpO2 96 % 09/12/22 1434   Vitals shown include unvalidated device data.    Electronically Signed By: Yoel Dai MD  September 12, 2022  2:35 PM

## 2022-09-12 NOTE — ANESTHESIA CARE TRANSFER NOTE
Patient: Kehinde Portillo    Procedure: Procedure(s):  Holmium Laser Enucleation of the Prostate       Diagnosis: Urinary retention [R33.9]  Diagnosis Additional Information: No value filed.    Anesthesia Type:   General     Note:    Oropharynx: oropharynx clear of all foreign objects and spontaneously breathing  Level of Consciousness: awake  Oxygen Supplementation: face mask  Level of Supplemental Oxygen (L/min / FiO2): 7  Independent Airway: airway patency satisfactory and stable  Dentition: dentition unchanged  Vital Signs Stable: post-procedure vital signs reviewed and stable  Report to RN Given: handoff report given  Patient transferred to: PACU    Handoff Report: Identifed the Patient, Identified the Reponsible Provider, Reviewed the pertinent medical history, Discussed the surgical course, Reviewed Intra-OP anesthesia mangement and issues during anesthesia, Set expectations for post-procedure period and Allowed opportunity for questions and acknowledgement of understanding      Vitals:  Vitals Value Taken Time   BP     Temp     Pulse 89 09/12/22 1154   Resp 12 09/12/22 1154   SpO2 98 % 09/12/22 1154   Vitals shown include unvalidated device data.    Electronically Signed By: THELMA Pruitt CRNA  September 12, 2022  11:55 AM

## 2022-09-12 NOTE — PHARMACY-VANCOMYCIN DOSING SERVICE
Pharmacy Vancomycin Initial Note  Date of Service 2022  Patient's  1940  82 year old, male    Indication: Postoperative Infection and Surgical Prophylaxis    Current estimated CrCl = Estimated Creatinine Clearance: 54.5 mL/min (based on SCr of 1.04 mg/dL).    Creatinine for last 3 days  2022:  4:33 PM Creatinine 1.04 mg/dL    Recent Vancomycin Level(s) for last 3 days  No results found for requested labs within last 72 hours.      Vancomycin IV Administrations (past 72 hours)                   vancomycin (VANCOCIN) 1000 mg in dextrose 5% 200 mL PREMIX (mg) 1,000 mg Given 22 0830                Nephrotoxins and other renal medications (From now, onward)    Start     Dose/Rate Route Frequency Ordered Stop    22 0830  vancomycin (VANCOCIN) 1000 mg in dextrose 5% 200 mL PREMIX         1,000 mg  200 mL/hr over 1 Hours Intravenous EVERY 24 HOURS 22 1627            Contrast Orders - past 72 hours (72h ago, onward)    None          Insight[employer] Prediction of Planned Initial Vancomycin Regimen  Loading dose: N/A  Regimen: 1000 mg IV every 24 hours.  Start time: 19:16 on 2022  Exposure target: AUC24 (range)400-600 mg/L.hr   AUC24,ss: 403 mg/L.hr  Probability of AUC24 > 400: 51 %  Ctrough,ss: 11.9 mg/L  Probability of Ctrough,ss > 20: 9 %  Probability of nephrotoxicity (Lodise GERTRUDE ): 7 %        Plan:  1. Start vancomycin  1000 mg IV q24h.   2. Vancomycin monitoring method: AUC  3. Vancomycin therapeutic monitoring goal: 400-600 mg*h/L  4. Pharmacy will check vancomycin levels as appropriate in 1-3 Days.    5. Serum creatinine levels will be ordered a minimum of twice weekly.      Maria C Hendricks, PharmD, BCPS

## 2022-09-12 NOTE — ANESTHESIA CARE TRANSFER NOTE
Patient: Kehinde Portillo    Procedure: Procedure(s):  Holmium Laser Enucleation of the Prostate       Diagnosis: Urinary retention [R33.9]  Diagnosis Additional Information: No value filed.    Anesthesia Type:   General     Note:      Level of Consciousness: awake  Oxygen Supplementation: nasal cannula    Independent Airway: airway patency satisfactory and stable        Patient transferred to: PACU    Handoff Report: Identifed the Patient, Identified the Reponsible Provider, Reviewed the pertinent medical history, Discussed the surgical course, Reviewed Intra-OP anesthesia mangement and issues during anesthesia, Set expectations for post-procedure period and Allowed opportunity for questions and acknowledgement of understanding      Vitals:  Vitals Value Taken Time   /75 09/12/22 1415   Temp 36.7  C (98.1  F) 09/12/22 1200   Pulse 74 09/12/22 1434   Resp 7 09/12/22 1434   SpO2 96 % 09/12/22 1434   Vitals shown include unvalidated device data.    Electronically Signed By: Yoel Dai MD  September 12, 2022  11:55 AM

## 2022-09-12 NOTE — DISCHARGE INSTRUCTIONS
"Activity   - No strenuous exercise for 2 weeks.   - No lifting, pushing, pulling more than 10 pounds for 2 weeks.   - No bike riding for 1 month   - Do not strain with bowel movements.   - Do not drive until you can press the brake pedal quickly and fully without pain.     Urination   - Some light redness (up to a watermelon-like-pink in color) is expected in the upcoming 10-14 days.   - If having hematuria (blood in the urine), make sure to increase your water intake and monitor for improvement   - If you are unable to urinate you should return urgently to the ED or call clinic to try to arrange for an urgent visit same day.     Medications   - Keflex - to help prevent infection. Take until gone.  - Take a stool softener (over the counter medications like Colace) as needed to avoid straining with bowel movements     Follow-Up:   - Call your primary care provider to touch base regarding your recent admission.   - Call or return sooner than your regularly scheduled visit if you develop any of the following: fever (greater than 101.5), uncontrolled pain, uncontrolled nausea or vomiting, worsening blood in urine or inability to urinate as above.     Phone numbers:   - Monday through Friday 8am to 4:30pm: Call [phone] with questions or to schedule or confirm appointment.   - Nights or weekends: call the after hours emergency pager - [phone] and tell the  \"I would like to page the Urology Resident on call.\"   - For emergencies, call [phone]    "

## 2022-09-12 NOTE — ANESTHESIA PREPROCEDURE EVALUATION
Anesthesia Pre-Procedure Evaluation    Patient: Kehinde Portillo   MRN: [id number] : 1940        Procedure : Procedure(s):  Holmium Laser Enucleation of the Prostate          No past medical history on file.   No past surgical history on file.   Allergies   Allergen Reactions     Aspirin Other (See Comments)     SOB  SOB        Social History     Tobacco Use     Smoking status: Never Smoker     Smokeless tobacco: Never Used   Substance Use Topics     Alcohol use: Not on file      Wt Readings from Last 1 Encounters:   22 70.4 kg (155 lb 3.3 oz)        Anesthesia Evaluation   Pt has had prior anesthetic.         ROS/MED HX  ENT/Pulmonary:       Neurologic:     (+) migraines, TIA,     Cardiovascular:     (+) hypertension-----    METS/Exercise Tolerance:     Hematologic: Comments: CML      Musculoskeletal:       GI/Hepatic:       Renal/Genitourinary:       Endo:       Psychiatric/Substance Use:       Infectious Disease:       Malignancy:       Other:            Physical Exam    Airway        Mallampati: II   TM distance: > 3 FB   Neck ROM: full   Mouth opening: > 3 cm    Respiratory Devices and Support         Dental  no notable dental history         Cardiovascular   cardiovascular exam normal          Pulmonary   pulmonary exam normal                OUTSIDE LABS:  CBC: No results found for: WBC, HGB, HCT, PLT  BMP:   Lab Results   Component Value Date    GLC 99 2022     COAGS: No results found for: PTT, INR, FIBR  POC: No results found for: BGM, HCG, HCGS  HEPATIC: No results found for: ALBUMIN, PROTTOTAL, ALT, AST, GGT, ALKPHOS, BILITOTAL, BILIDIRECT, ROULA  OTHER: No results found for: PH, LACT, A1C, LEONA, PHOS, MAG, LIPASE, AMYLASE, TSH, T4, T3, CRP, SED    Anesthesia Plan    ASA Status:  3      Anesthesia Type: General.     - Airway: LMA   Induction: Intravenous.   Maintenance: Balanced.        Consents    Anesthesia Plan(s) and associated risks, benefits, and realistic alternatives discussed.  Questions answered and patient/representative(s) expressed understanding.    - Discussed:     - Discussed with:  Patient    Use of blood products discussed: Yes.     - Discussed with: Patient.     - Consented: consented to blood products            Reason for refusal: other.     Postoperative Care    Pain management: Multi-modal analgesia.        Comments:                Yoel Dai MD

## 2022-09-12 NOTE — TREATMENT PLAN
Dr. Castrejon here at bedside, advised family and patient to keep over night at this time.   
Performed

## 2022-09-12 NOTE — OP NOTE
Operative Report  9/12/2022    PREOPERATIVE DIAGNOSIS:  Prostatic hypertrophy with lower urinary tract symptoms  POSTOPERATIVE DIAGNOSIS: Same as above    PROCEDURE PERFORMED: Holmium laser enucleation of the prostate  ATTENDING SURGEON: Isaiah Castrejon MD  RESIDENT SURGEON: Melvin Reza MD    FINDINGS: Approximately 120 gm prostate with trilobar hypertrophy. Bladder with severe trabeculation  ANESTHESIA: GET  INTRAVENOUS FLUIDS: See anesthesia records  ESTIMATED BLOOD LOSS: 100 mL   SPECIMENS: Prostate adenoma  DRAINS: 22-Argentine 3-way catheter with 60 ml in balloon     INDICATIONS FOR PROCEDURE: Kehinde Portillo is a(n) 82 year old male who was seen in consultation for BPH with obstructive voiding symptoms. He has elected treatment with laser enucleation. Prostate volume estimated to be 120 grams. His digital rectal exam was unremarkable.  After discussion of the risks, benefits and alternatives of the procedure, the patient agreed to proceed with the above stated procdure.    DESCRIPTION OF PROCEDURE: After obtaining informed consent, the patient was taken to the operating room and placed under general anesthesia.  He was repositioned in dorsal lithotomy making sure that the legs were positioned and padded safely.  He was then prepped and draped in standard sterile fashion.  Culture directed antibiotics were administered and bilateral sequential compression devices were placed.  A time out was performed confirming the appropriate patient identity and planned procedure.     The procedure was begun by generously lubricating the urethra.  The urethra was unremarkable though did require use of the driss urethrotome in order to place the 26F outer sheath.  The outer sheath was placed over a deflecting obturator and we then looked the scope through the posterior urethra and into the bladder.  The prostate was noted to have a trilobar configuration.  At this point we inserted the 550 micron holmium laser through the  7F laser catheter.    We began enucleation by making an apical incision adjacent to the veromontanum.  We developed the apical plane on the left side and carried this laterally until 3 oclock.  We then proceeded to make a counter incision in the same fashion on the right side.  We next dissected the prostate out in a circumferential fashion, coming over the top of the gland and entering the bladder neck.  We next identified the mucosal strip anteriorly and transected it with the laser.  We then proceeded to take down the remaining lateral and posterior attachments which allowed us to push the adenoma in an en-bloc fashion into the bladder. Total enucleation time was 65 minutes.    At this point there was a moderate amount of bleeding and approximately 25 minutes were spent identifying bleeding vessels in the fossa and coagulating them with the laser.  Once hemostasis was adequate we switched from the laser resectoscope to the 26F offset telescope with the Piranha morcellation device.  We added an extra inflow to distend the bladder.  The blades were adjusted and set at a rate of 1500 RPM.  We proceeded with morcellation making sure that we morcellated the entirety of the adenoma.  Total morcellation time was 6 minutes.     We then switched back to the laser resectoscope one final time to ensure that no residual tissue was left in the bladder.  We also confirmed that the bladder was unharmed from morcellation and that both ureteral orifices were unharmed during the procedure.  We inspected the fossa and obtained final hemostasis.   We looked the scope out noting that the sphincter was completely intact without evidence of thermal or mechanical injury.     We lubricated the urethra again and passed a 22F 3-way aguayo catheter over a catheter guide.  The urine was noted to be light pink.  We filled the balloon with 60 ml of sterile water and DID place the catheter on traction.  The patient was woken from anesthesia and  taken to the recovery room in stable condition.     The specimen was weighed and found to be approximately 83 g.     POSTOPERATIVE PLAN:   -Admit to Urology  -Vanc while in house, Keflex on discharge  -Plan for TOV tomorrow and discharge    Melvin Reza MD  Urology Resident    I, Isaiah Castrejon, was present and participatory for the entirety of the procedure

## 2022-09-13 VITALS
SYSTOLIC BLOOD PRESSURE: 178 MMHG | WEIGHT: 155.2 LBS | TEMPERATURE: 97.6 F | RESPIRATION RATE: 14 BRPM | HEIGHT: 68 IN | BODY MASS INDEX: 23.52 KG/M2 | DIASTOLIC BLOOD PRESSURE: 85 MMHG | HEART RATE: 70 BPM | OXYGEN SATURATION: 95 %

## 2022-09-13 LAB
ANION GAP SERPL CALCULATED.3IONS-SCNC: 4 MMOL/L (ref 3–14)
BUN SERPL-MCNC: 18 MG/DL (ref 7–30)
CALCIUM SERPL-MCNC: 8.6 MG/DL (ref 8.5–10.1)
CHLORIDE BLD-SCNC: 110 MMOL/L (ref 94–109)
CO2 SERPL-SCNC: 29 MMOL/L (ref 20–32)
CREAT SERPL-MCNC: 0.92 MG/DL (ref 0.66–1.25)
ERYTHROCYTE [DISTWIDTH] IN BLOOD BY AUTOMATED COUNT: 14.3 % (ref 10–15)
GFR SERPL CREATININE-BSD FRML MDRD: 83 ML/MIN/1.73M2
GLUCOSE BLD-MCNC: 109 MG/DL (ref 70–99)
HCT VFR BLD AUTO: 34 % (ref 40–53)
HGB BLD-MCNC: 11.3 G/DL (ref 13.3–17.7)
MCH RBC QN AUTO: 28.9 PG (ref 26.5–33)
MCHC RBC AUTO-ENTMCNC: 33.2 G/DL (ref 31.5–36.5)
MCV RBC AUTO: 87 FL (ref 78–100)
PLATELET # BLD AUTO: 151 10E3/UL (ref 150–450)
POTASSIUM BLD-SCNC: 3.7 MMOL/L (ref 3.4–5.3)
RBC # BLD AUTO: 3.91 10E6/UL (ref 4.4–5.9)
SODIUM SERPL-SCNC: 143 MMOL/L (ref 133–144)
WBC # BLD AUTO: 31.2 10E3/UL (ref 4–11)

## 2022-09-13 PROCEDURE — 250N000013 HC RX MED GY IP 250 OP 250 PS 637: Performed by: UROLOGY

## 2022-09-13 PROCEDURE — 85027 COMPLETE CBC AUTOMATED: CPT | Performed by: UROLOGY

## 2022-09-13 PROCEDURE — 36415 COLL VENOUS BLD VENIPUNCTURE: CPT | Performed by: UROLOGY

## 2022-09-13 PROCEDURE — 80048 BASIC METABOLIC PNL TOTAL CA: CPT | Performed by: UROLOGY

## 2022-09-13 PROCEDURE — 258N000003 HC RX IP 258 OP 636: Performed by: UROLOGY

## 2022-09-13 RX ORDER — UBIDECARENONE 100 MG
100 CAPSULE ORAL 2 TIMES DAILY
COMMUNITY

## 2022-09-13 RX ADMIN — ACETAMINOPHEN 650 MG: 325 TABLET, FILM COATED ORAL at 10:22

## 2022-09-13 RX ADMIN — ACETAMINOPHEN 650 MG: 325 TABLET, FILM COATED ORAL at 04:34

## 2022-09-13 RX ADMIN — SODIUM CHLORIDE, POTASSIUM CHLORIDE, SODIUM LACTATE AND CALCIUM CHLORIDE: 600; 310; 30; 20 INJECTION, SOLUTION INTRAVENOUS at 04:34

## 2022-09-13 RX ADMIN — EPLERENONE 50 MG: 25 TABLET, FILM COATED ORAL at 09:00

## 2022-09-13 RX ADMIN — AMLODIPINE BESYLATE 5 MG: 2.5 TABLET ORAL at 09:00

## 2022-09-13 ASSESSMENT — ACTIVITIES OF DAILY LIVING (ADL)
ADLS_ACUITY_SCORE: 33

## 2022-09-13 NOTE — PHARMACY-ADMISSION MEDICATION HISTORY
Admission Medication History Completed by Pharmacy    See [facility] Admission Navigator for allergy information, preferred outpatient pharmacy, prior to admission medications and immunization status.     Medication History Sources:     Patient interview    Medication fill history    Care everywhere     Changes made to PTA medication list (reason):    Added: Coenzyme and study drug -orelabrutinib     Deleted: Ciprofloxacin-dexamethasone otic suspension- no longer taking it    Changed: Updated Flonase, multivitamin w/ mineral and vitamin D sig.    Additional Information:    Patient understood the indications of all of his home medications.    Prior to Admission medications    Medication Sig Last Dose Taking? Auth Provider Long Term End Date   amLODIPine (NORVASC) 5 MG tablet Take 5 mg by mouth daily 9/12/2022 at 0600 Yes Reported, Patient Yes    amoxicillin (AMOXIL) 500 MG capsule Take 2,000 mg by mouth once In March More than a month at Unknown time Yes Reported, Patient     atorvastatin (LIPITOR) 20 MG tablet TAKE 1 TABLET (20 MG) BY MOUTH ONCE DAILY WITH EVENING MEAL. 9/11/2022 at Unknown time Yes Reported, Patient Yes    co-enzyme Q-10 100 MG CAPS capsule Take 100 mg by mouth  2 times daily Past Week at Unknown time Yes Unknown, Entered By History     eplerenone (INSPRA) 50 MG tablet Take 50 mg by mouth daily 9/11/2022 at Unknown time Yes Reported, Patient Yes    FLOVENT  MCG/ACT inhaler TAKE 2 PUFFS BY MOUTH TWICE A DAY 9/11/2022 at Unknown time Yes Reported, Patient Yes    fluticasone (FLONASE) 50 MCG/ACT nasal spray Spray 2 sprays in nostril 2 times daily 9/11/2022 at Unknown time Yes Reported, Patient     Multiple Vitamins-Minerals (SUPER THERA CARLY M) TABS Take 1 tablet by mouth daily Past Week at Unknown time Yes Reported, Patient     NONFORMULARY Take 150 mg by mouth daily Research IRB [id number] orelabrutinib (ICP-022) 50 mg tablet, Take 3 tablets (150 mg total) by mouth daily. Take once daily (ideally  the same time in the morning). CLL study drug from [facility]. Was on hold about a week ago before surgery  Yes Unknown, Entered By History     terazosin (HYTRIN) 10 MG capsule TAKE 1 CAPSULE (10 MG) BY MOUTH BEFORE BEDTIME. 9/11/2022 at Unknown time Yes Reported, Patient Yes    vitamin D3 (CHOLECALCIFEROL) 125 MCG (5000 UT) tablet Take 5,000 Units by mouth daily Past Week at Unknown time Yes Reported, Patient         Date completed: 09/13/22    Medication history completed by: Shamar Garcia RPH

## 2022-09-13 NOTE — DISCHARGE SUMMARY
Discharge instructions explained. Pt verbalized understanding. PIV removed with cannula intact. Prescription medication given to pt. Pt belongings left with pt. No complaints voiced upon leaving. No signs of distress. Condition stable.

## 2022-09-13 NOTE — PROGRESS NOTES
"Urology  Progress Note    NAEO    Exam  BP (!) 140/70   Pulse 78   Temp 97.8  F (36.6  C) (Oral)   Resp 16   Ht 1.727 m (5' 8\")   Wt 70.4 kg (155 lb 3.3 oz)   SpO2 94%   BMI 23.60 kg/m    No acute distress  Unlabored breathing  Abdomen soft, nontender, nondistended.   Wallace with clear urine in tubing    UOP CBI    Labs    AM labs pending    Assessment/Plan  82 year old y/o male POD#1 s/p HOLEP for BPH with urinary obstruction.     Neuro: APAP, Tramadol for pain control  CV: OANH  Pulm: incentive spirometry while awake  FEN/GI: Regular diet  Endo: OANH  : TOV initiated, please page [phone] with results of PVR after two separate voids. Decision to discharge will be based on this information  Heme/ID:  Keflex x5d on discharge  Activity: Up ad tamy  PPx: SCDs    Seen and examined with the chief resident. Will discuss with Dr. Castrejon.     Contacting the Urology Team     Please use the following job codes to reach the Urology Team. Note that you must use an in house phone and that job codes cannot receive text pages.     On weekdays, dial [phone] (or star-star-star [phone] on the new [employer] telephones) then [phone] to reach the Adult Urology resident or PA on call    On weekdays, dial [phone] (or star-star-star [phone] on the new [employer] telephones) then [phone] to reach the Pediatric Urology resident    On weeknights and weekends, dial [phone] (or star-star-star [phone] on the new [employer] telephones) then [phone] to reach the Urology resident on call (for both Adult and Pediatrics)              "

## 2022-09-14 ENCOUNTER — PATIENT OUTREACH (OUTPATIENT)
Dept: UROLOGY | Facility: CLINIC | Age: 82
End: 2022-09-14

## 2022-09-14 NOTE — TELEPHONE ENCOUNTER
Pt called into clinic wishing to request Dr. Castrejon call cabin number next week, they will dony be at home. Note routed

## 2022-09-16 LAB
PATH REPORT.COMMENTS IMP SPEC: ABNORMAL
PATH REPORT.COMMENTS IMP SPEC: ABNORMAL
PATH REPORT.COMMENTS IMP SPEC: YES
PATH REPORT.FINAL DX SPEC: ABNORMAL
PATH REPORT.GROSS SPEC: ABNORMAL
PATH REPORT.MICROSCOPIC SPEC OTHER STN: ABNORMAL
PATH REPORT.RELEVANT HX SPEC: ABNORMAL
PHOTO IMAGE: ABNORMAL

## 2022-09-16 PROCEDURE — 88305 TISSUE EXAM BY PATHOLOGIST: CPT | Mod: 26 | Performed by: PATHOLOGY

## 2022-09-26 ENCOUNTER — PATIENT OUTREACH (OUTPATIENT)
Dept: UROLOGY | Facility: CLINIC | Age: 82
End: 2022-09-26

## 2022-09-26 NOTE — TELEPHONE ENCOUNTER
Heber phoned into clinic x2 with voicemails requesting return call    Pathology returned as 3+3 hayden 6, pt notified and will expect call from provider today with plan. Pt notified plan would be active surveillance with PSAs but that provider would call and review.    Pt states he is doing well, no dysuria, some watermelon color, and a little leakage with activity. Overall he is feeling well and happy with result. nOte routed to provider

## 2022-09-27 DIAGNOSIS — C61 PROSTATE CANCER (H): Primary | ICD-10-CM

## 2022-09-28 ENCOUNTER — TELEPHONE (OUTPATIENT)
Dept: UROLOGY | Facility: CLINIC | Age: 82
End: 2022-09-28

## 2022-09-28 NOTE — TELEPHONE ENCOUNTER
Pt called and notified of scheduled visits with Dr. Castrejon.    Sarah Telles, [facility]  09/28/22  2:29 PM

## 2022-10-12 PROBLEM — R35.0 URINARY FREQUENCY: Status: RESOLVED | Noted: 2022-07-19 | Resolved: 2022-10-12

## 2022-10-12 PROBLEM — N40.0 ENLARGED PROSTATE: Status: RESOLVED | Noted: 2022-07-19 | Resolved: 2022-10-12

## 2022-10-12 PROBLEM — N39.41 URGENCY INCONTINENCE: Status: RESOLVED | Noted: 2022-07-19 | Resolved: 2022-10-12

## 2022-10-12 NOTE — PROGRESS NOTES
Subjective:  HPI  Physical Exam                    Objective:  System    Physical Exam    General     ROS    Assessment/Plan:    DISCHARGE REPORT    Progress reporting period is from 7/19/22 to 8/15/22     SUBJECTIVE  Subjective: Heber reports doing the kegels 2-3x daily, he forgets to do them more often. He also forgot to do the bladder diary. He states  that he drinks very little caffeine drinks and probably not enough water. He feels that urgency is about the same, urinating every 2-3 hrs in day but the worst at night getting up 3-4 times.           Changes in function: No changes noted in function since last SOAP note   Adverse reactions: None;   ,     The subjective and objective information are from the last SOAP note on this patient.    OBJECTIVE  Objective: episodes of urge incontinence: 2 in past month. Pt sits in slouched posture- encouraged neutral sittingposture with kegel and new exercises. Reviewed importance of more frequent kegels daily. Instruct in PF/ABD, PF/ADD and PF/plie exercises 1x daily. Discussed normal daily fluid intake( increase water intake daily) and voiding intervals.      ASSESSMENT/PLAN  Updated problem list and treatment plan: Diagnosis 1: urinary frequency/urgency, enlarged prostate     STG/LTGs have been met or progress has been made towards goals:    Assessment of Progress: The patient has not returned to therapy. Current status is unknown.  Self Management Plans:  Patient has been instructed in a home treatment program.  Patient  has been instructed in self management of symptoms.    Kehinde continues to require the following intervention to meet STG and LTG's: PT  We will discharge this patient from PT.    Recommendations:  Discharge from PT    Please refer to the daily flowsheet for treatment today, total treatment time and time spent performing 1:1 timed codes.

## 2022-11-11 ENCOUNTER — PRE VISIT (OUTPATIENT)
Dept: UROLOGY | Facility: CLINIC | Age: 82
End: 2022-11-11

## 2022-11-11 NOTE — TELEPHONE ENCOUNTER
Reason for visit: Post-op s/p HoLEP 9/12/22    Dx/Hx/Sx: Urinary Retention    Records/imaging/labs/orders: In EPIC    At Rooming: flow / pvr    Fidencio Malone, EMT  November 11, 2022  2:06 PM

## 2022-11-14 NOTE — TELEPHONE ENCOUNTER
Action November 14, 2022 JTV 9:21 AM    Action Taken CSS received and resolved images from [facility]. Records sent to scanning for processing.

## 2022-11-19 ENCOUNTER — HEALTH MAINTENANCE LETTER (OUTPATIENT)
Age: 82
End: 2022-11-19

## 2022-11-28 ENCOUNTER — LAB (OUTPATIENT)
Dept: LAB | Facility: CLINIC | Age: 82
End: 2022-11-28
Payer: MEDICARE

## 2022-11-28 DIAGNOSIS — C61 PROSTATE CANCER (H): ICD-10-CM

## 2022-11-28 PROCEDURE — 84153 ASSAY OF PSA TOTAL: CPT

## 2022-11-28 PROCEDURE — 36415 COLL VENOUS BLD VENIPUNCTURE: CPT

## 2022-11-29 ENCOUNTER — OFFICE VISIT (OUTPATIENT)
Dept: UROLOGY | Facility: CLINIC | Age: 82
End: 2022-11-29
Payer: MEDICARE

## 2022-11-29 VITALS
SYSTOLIC BLOOD PRESSURE: 148 MMHG | DIASTOLIC BLOOD PRESSURE: 86 MMHG | HEART RATE: 60 BPM | BODY MASS INDEX: 22.73 KG/M2 | HEIGHT: 68 IN | WEIGHT: 150 LBS

## 2022-11-29 DIAGNOSIS — R33.9 URINARY RETENTION: Primary | ICD-10-CM

## 2022-11-29 LAB — PSA SERPL-MCNC: 1.53 UG/L (ref 0–4)

## 2022-11-29 PROCEDURE — 99213 OFFICE O/P EST LOW 20 MIN: CPT | Mod: 24 | Performed by: UROLOGY

## 2022-11-29 ASSESSMENT — PAIN SCALES - GENERAL: PAINLEVEL: NO PAIN (0)

## 2022-11-29 NOTE — PROGRESS NOTES
UROLOGY OUTPATIENT VISIT      Chief Complaint:   Enlarged prostate      Synopsis    Kehinde Portillo is a very pleasant AGE: 82 year old year old person.  He underwent HoLEP 2.5 months ago.  Pathology was 60 gm of tissue with small amount of low grade prostate cancer.    Prostate, transurethral resection:  - Prostatic adenocarcinoma  - Александр score 6 (3+3)  - Grade group 1  - Involves less than 5% of submitted tissue  - Hyperplastic prostate tissue    Today reports:  Great stream  1-2 dribble pads per day  IPSS 11/35  QOL 3    Updated PSA 1.5    LIU is unremarkable, no nodules, small prostate         Medications     Current Outpatient Medications   Medication     amLODIPine (NORVASC) 5 MG tablet     amoxicillin (AMOXIL) 500 MG capsule     atorvastatin (LIPITOR) 20 MG tablet     cephALEXin (KEFLEX) 500 MG capsule     co-enzyme Q-10 100 MG CAPS capsule     eplerenone (INSPRA) 50 MG tablet     FLOVENT  MCG/ACT inhaler     fluticasone (FLONASE) 50 MCG/ACT nasal spray     Multiple Vitamins-Minerals (SUPER THERA CARLY M) TABS     NONFORMULARY     terazosin (HYTRIN) 10 MG capsule     vitamin D3 (CHOLECALCIFEROL) 125 MCG (5000 UT) tablet     No current facility-administered medications for this visit.         The following  distinct labs were reviewed    I personally reviewed all applicable laboratory data and went over findings with patient  Significant for:    CBC RESULTS:  Recent Labs   Lab Test 09/13/22  0608   WBC 31.2*   HGB 11.3*           BMP RESULTS:  Recent Labs   Lab Test 09/13/22  0608 09/12/22  1728 09/12/22  1633 09/12/22  0734     --   --   --    POTASSIUM 3.7  --   --   --    CHLORIDE 110*  --   --   --    CO2 29  --   --   --    ANIONGAP 4  --   --   --    * 156*  --  99   BUN 18  --   --   --    CR 0.92  --  1.04  --    GFRESTIMATED 83  --  72  --        CALCIUM RESULTS:  Recent Labs   Lab Test 09/13/22  0608   LEONA 8.6       PSA RESULTS  PSA Tumor Marker   Date Value Ref  Range Status   11/28/2022 1.53 0.00 - 4.00 ug/L Final            Assessment/Plan   82 year old year old person with incidentally discovered prostate cancer post HoLEP  -We spent the majority of the visit discussing the implications of low grade prostate cancer at his age and in the context of his competing health risks.  We reviewed the natural history, offered watchful waiting, imaging, repeat biopsy and referral for treatment.  Ultimately shared decision made to continue observation.  WIll repeat PSA in 6 months.  If rising consider MRI +/- biopsy    CC:  Clinic, [facility]

## 2022-11-29 NOTE — NURSING NOTE
"Chief Complaint   Patient presents with     Follow Up     S/p HoLEP 9/12/22       Blood pressure (!) 148/86, pulse 60, height 1.727 m (5' 8\"), weight 68 kg (150 lb). Body mass index is 22.81 kg/m .    Patient Active Problem List   Diagnosis     BPH with obstruction/lower urinary tract symptoms       Allergies   Allergen Reactions     Aspirin Other (See Comments), Anaphylaxis and Shortness Of Breath     SOB  SOB    Other reaction(s): Other (see comments)  SOB  SOB  SOB         Current Outpatient Medications   Medication Sig Dispense Refill     amLODIPine (NORVASC) 5 MG tablet Take 5 mg by mouth daily       amoxicillin (AMOXIL) 500 MG capsule Take 2,000 mg by mouth once In March       atorvastatin (LIPITOR) 20 MG tablet TAKE 1 TABLET (20 MG) BY MOUTH ONCE DAILY WITH EVENING MEAL.       cephALEXin (KEFLEX) 500 MG capsule Take 1 capsule (500 mg) by mouth 2 times daily 10 capsule 0     co-enzyme Q-10 100 MG CAPS capsule Take 100 mg by mouth 2 times daily       eplerenone (INSPRA) 50 MG tablet Take 50 mg by mouth daily       FLOVENT  MCG/ACT inhaler TAKE 2 PUFFS BY MOUTH TWICE A DAY       fluticasone (FLONASE) 50 MCG/ACT nasal spray Spray 2 sprays in nostril 2 times daily       Multiple Vitamins-Minerals (SUPER THERA CARLY M) TABS Take 1 tablet by mouth daily       NONFORMULARY Take 150 mg by mouth daily Research IRB [id number] orelabrutinib (ICP-022) 50 mg tablet, Take 3 tablets (150 mg total) by mouth daily. Take once daily (ideally the same time in the morning). CLL study drug from [facility]. Was on hold about a week ago before surgery       terazosin (HYTRIN) 10 MG capsule TAKE 1 CAPSULE (10 MG) BY MOUTH BEFORE BEDTIME.       vitamin D3 (CHOLECALCIFEROL) 125 MCG (5000 UT) tablet Take 5,000 Units by mouth daily         Social History     Tobacco Use     Smoking status: Never     Smokeless tobacco: Never       Fidencio Malone, EMT  11/29/2022  1:39 PM  "

## 2022-11-29 NOTE — PATIENT INSTRUCTIONS
Please schedule a 6-month follow-up with Dr. Castrejon with a PSA prior.    It was a pleasure meeting with you today.  Thank you for allowing me and my team the privilege of caring for you today.  YOU are the reason we are here, and I truly hope we provided you with the excellent service you deserve.  Please let us know if there is anything else we can do for you so that we can be sure you are leaving completely satisfied with your care experience.

## 2022-11-29 NOTE — LETTER
11/29/2022       RE: Kehinde Portillo  [address] [address] [facility] [address]     Dear Colleague,    Thank you for referring your patient, Kehinde Portillo, to the [facility] UROLOGY CLINIC [place] at [facility]. Please see a copy of my visit note below.          UROLOGY OUTPATIENT VISIT      Chief Complaint:   Enlarged prostate      Synopsis    Kehinde Portillo is a very pleasant AGE: 82 year old year old person.  He underwent HoLEP 2.5 months ago.  Pathology was 60 gm of tissue with small amount of low grade prostate cancer.    Prostate, transurethral resection:  - Prostatic adenocarcinoma  - Александр score 6 (3+3)  - Grade group 1  - Involves less than 5% of submitted tissue  - Hyperplastic prostate tissue    Today reports:  Great stream  1-2 dribble pads per day  IPSS 11/35  QOL 3    Updated PSA 1.5    LIU is unremarkable, no nodules, small prostate         Medications     Current Outpatient Medications   Medication     amLODIPine (NORVASC) 5 MG tablet     amoxicillin (AMOXIL) 500 MG capsule     atorvastatin (LIPITOR) 20 MG tablet     cephALEXin (KEFLEX) 500 MG capsule     co-enzyme Q-10 100 MG CAPS capsule     eplerenone (INSPRA) 50 MG tablet     FLOVENT  MCG/ACT inhaler     fluticasone (FLONASE) 50 MCG/ACT nasal spray     Multiple Vitamins-Minerals (SUPER THERA CARLY M) TABS     NONFORMULARY     terazosin (HYTRIN) 10 MG capsule     vitamin D3 (CHOLECALCIFEROL) 125 MCG (5000 UT) tablet     No current facility-administered medications for this visit.         The following  distinct labs were reviewed    I personally reviewed all applicable laboratory data and went over findings with patient  Significant for:    CBC RESULTS:  Recent Labs   Lab Test 09/13/22  0608   WBC 31.2*   HGB 11.3*           BMP RESULTS:  Recent Labs   Lab Test 09/13/22  0608 09/12/22  1728 09/12/22  1633 09/12/22  0734     --   --   --    POTASSIUM 3.7  --    --   --    CHLORIDE 110*  --   --   --    CO2 29  --   --   --    ANIONGAP 4  --   --   --    * 156*  --  99   BUN 18  --   --   --    CR 0.92  --  1.04  --    GFRESTIMATED 83  --  72  --        CALCIUM RESULTS:  Recent Labs   Lab Test 09/13/22  0608   LEONA 8.6       PSA RESULTS  PSA Tumor Marker   Date Value Ref Range Status   11/28/2022 1.53 0.00 - 4.00 ug/L Final            Assessment/Plan   82 year old year old person with incidentally discovered prostate cancer post HoLEP  -We spent the majority of the visit discussing the implications of low grade prostate cancer at his age and in the context of his competing health risks.  We reviewed the natural history, offered watchful waiting, imaging, repeat biopsy and referral for treatment.  Ultimately shared decision made to continue observation.  WIll repeat PSA in 6 months.  If rising consider MRI +/- biopsy    CC:  Clinic, [facility]      Isaiah Castrejon MD

## 2023-01-31 ENCOUNTER — TRANSFERRED RECORDS (OUTPATIENT)
Dept: HEALTH INFORMATION MANAGEMENT | Facility: CLINIC | Age: 83
End: 2023-01-31
Payer: MEDICARE

## 2023-05-04 ENCOUNTER — PRE VISIT (OUTPATIENT)
Dept: UROLOGY | Facility: CLINIC | Age: 83
End: 2023-05-04
Payer: MEDICARE

## 2023-05-04 NOTE — TELEPHONE ENCOUNTER
Reason for visit: Follow-Up    Dx/Hx/Sx: Urinary Retention / prostate cancer post-HoLEP 9/12/2022    Records/imaging/labs/orders: PSA pending    At Rooming: marleni Malone, EMT  May 4, 2023  10:22 AM

## 2023-05-26 ENCOUNTER — LAB (OUTPATIENT)
Dept: LAB | Facility: CLINIC | Age: 83
End: 2023-05-26
Payer: MEDICARE

## 2023-05-26 DIAGNOSIS — R33.9 URINARY RETENTION: ICD-10-CM

## 2023-05-26 LAB — PSA SERPL-MCNC: 0.84 UG/L (ref 0–4)

## 2023-05-26 PROCEDURE — 84153 ASSAY OF PSA TOTAL: CPT

## 2023-05-26 PROCEDURE — 36415 COLL VENOUS BLD VENIPUNCTURE: CPT

## 2023-05-30 ENCOUNTER — OFFICE VISIT (OUTPATIENT)
Dept: UROLOGY | Facility: CLINIC | Age: 83
End: 2023-05-30
Payer: MEDICARE

## 2023-05-30 VITALS
SYSTOLIC BLOOD PRESSURE: 137 MMHG | HEART RATE: 61 BPM | BODY MASS INDEX: 23.19 KG/M2 | WEIGHT: 153 LBS | DIASTOLIC BLOOD PRESSURE: 70 MMHG | HEIGHT: 68 IN

## 2023-05-30 DIAGNOSIS — C61 PROSTATE CANCER (H): Primary | ICD-10-CM

## 2023-05-30 PROCEDURE — 99213 OFFICE O/P EST LOW 20 MIN: CPT | Performed by: UROLOGY

## 2023-05-30 ASSESSMENT — PAIN SCALES - GENERAL: PAINLEVEL: NO PAIN (0)

## 2023-05-30 NOTE — PATIENT INSTRUCTIONS
Please schedule a virtual visit in 6 months.    It was a pleasure meeting with you today.  Thank you for allowing me and my team the privilege of caring for you today.  YOU are the reason we are here, and I truly hope we provided you with the excellent service you deserve.  Please let us know if there is anything else we can do for you so that we can be sure you are leaving completely satisfied with your care experience.

## 2023-05-30 NOTE — LETTER
5/30/2023       RE: Kehinde Portillo  [address] [address] [address]     Dear Colleague,    Thank you for referring your patient, Kehinde Portillo, to the [facility]OLOGY CL[facility] [place] at [facility]. Please see a copy of my visit note below.          UROLOGY OUTPATIENT VISIT      Chief Complaint:   Prostate Cancer      Synopsis    Kehinde Portillo is a very pleasant AGE: 83 year old year old person     He underwent HoLEP 9 months ago.  Pathology was 60 gm of tissue with small amount of low grade prostate cancer.     Prostate, transurethral resection:  - Prostatic adenocarcinoma  - [place] score 6 (3+3)  - Grade group 1  - Involves less than 5% of submitted tissue  - Hyperplastic prostate tissue    Today reports:  Minimal leakage, wears a safety pad per day  Very pleased with overall urinary functional improvement   Updated PSA 0.8         Medications     Current Outpatient Medications   Medication    amLODIPine (NORVASC) 5 MG tablet    atorvastatin (LIPITOR) 20 MG tablet    co-enzyme Q-10 100 MG CAPS capsule    eplerenone (INSPRA) 50 MG tablet    FLOVENT  MCG/ACT inhaler    fluticasone (FLONASE) 50 MCG/ACT nasal spray    Multiple Vitamins-Minerals (SUPER THERA CARLY M) TABS    NONFORMULARY    terazosin (HYTRIN) 10 MG capsule    vitamin D3 (CHOLECALCIFEROL) 125 MCG (5000 UT) tablet    amoxicillin (AMOXIL) 500 MG capsule    cephALEXin (KEFLEX) 500 MG capsule     No current facility-administered medications for this visit.         The following  distinct labs were reviewed    I personally reviewed all applicable laboratory data and went over findings with patient  Significant for:    CBC RESULTS:  Recent Labs   Lab Test 09/13/22  0608   WBC 31.2*   HGB 11.3*           BMP RESULTS:  Recent Labs   Lab Test 09/13/22  0608 09/12/22  1728 09/12/22  1633 09/12/22  0734     --   --   --    POTASSIUM 3.7  --   --   --    CHLORIDE  110*  --   --   --    CO2 29  --   --   --    ANIONGAP 4  --   --   --    * 156*  --  99   BUN 18  --   --   --    CR 0.92  --  1.04  --    GFRESTIMATED 83  --  72  --        CALCIUM RESULTS:  Recent Labs   Lab Test 09/13/22  0608   LEONA 8.6       PTH RESULTS:  No results for input(s): PTHI in the last 44829 hours.    HGB A1C RESULTS:  No results found for: A1C    UA RESULTS:   No results for input(s): SG, URINEPH, NITRITE, RBCU, WBCU in the last 75025 hours.    PSA RESULTS  PSA Tumor Marker   Date Value Ref Range Status   05/26/2023 0.84 0.00 - 4.00 ug/L Final   11/28/2022 1.53 0.00 - 4.00 ug/L Final              Assessment/Plan   83 year old year old person with hx of prostate cancer found on HoLEP specimen  -Follow up  Months with PSA prior, may do virtual visit    CC:  No Ref-Primary, Physician  *      Sincerely,    Isaiah Castrejon MD

## 2023-05-30 NOTE — NURSING NOTE
"Chief Complaint   Patient presents with     Follow Up     Recheck on prostate problems.        Blood pressure 137/70, pulse 61, height 1.727 m (5' 8\"), weight 69.4 kg (153 lb). Body mass index is 23.26 kg/m .    Patient Active Problem List   Diagnosis     BPH with obstruction/lower urinary tract symptoms       Allergies   Allergen Reactions     Aspirin Other (See Comments), Anaphylaxis and Shortness Of Breath     SOB  SOB    Other reaction(s): Other (see comments)  SOB  SOB  SOB         Current Outpatient Medications   Medication Sig Dispense Refill     amLODIPine (NORVASC) 5 MG tablet Take 5 mg by mouth daily       atorvastatin (LIPITOR) 20 MG tablet TAKE 1 TABLET (20 MG) BY MOUTH ONCE DAILY WITH EVENING MEAL.       co-enzyme Q-10 100 MG CAPS capsule Take 100 mg by mouth 2 times daily       eplerenone (INSPRA) 50 MG tablet Take 50 mg by mouth daily       FLOVENT  MCG/ACT inhaler TAKE 2 PUFFS BY MOUTH TWICE A DAY       fluticasone (FLONASE) 50 MCG/ACT nasal spray Spray 2 sprays in nostril 2 times daily       Multiple Vitamins-Minerals (SUPER THERA CARLY M) TABS Take 1 tablet by mouth daily       NONFORMULARY Take 150 mg by mouth daily Research IRB [id number] orelabrutinib (ICP-022) 50 mg tablet, Take 3 tablets (150 mg total) by mouth daily. Take once daily (ideally the same time in the morning). CLL study drug from [facility]. Was on hold about a week ago before surgery       terazosin (HYTRIN) 10 MG capsule TAKE 1 CAPSULE (10 MG) BY MOUTH BEFORE BEDTIME.       vitamin D3 (CHOLECALCIFEROL) 125 MCG (5000 UT) tablet Take 5,000 Units by mouth daily       amoxicillin (AMOXIL) 500 MG capsule Take 2,000 mg by mouth once In March (Patient not taking: Reported on 5/30/2023)       cephALEXin (KEFLEX) 500 MG capsule Take 1 capsule (500 mg) by mouth 2 times daily 10 capsule 0       Social History     Tobacco Use     Smoking status: Never     Smokeless tobacco: Never       Renecia Light  5/30/2023  1:32 PM     "

## 2023-06-02 NOTE — PROGRESS NOTES
UROLOGY OUTPATIENT VISIT      Chief Complaint:   Prostate Cancer      Synopsis    Kehinde Portillo is a very pleasant AGE: 83 year old year old person     He underwent HoLEP 9 months ago.  Pathology was 60 gm of tissue with small amount of low grade prostate cancer.     Prostate, transurethral resection:  - Prostatic adenocarcinoma  - Александр score 6 (3+3)  - Grade group 1  - Involves less than 5% of submitted tissue  - Hyperplastic prostate tissue    Today reports:  Minimal leakage, wears a safety pad per day  Very pleased with overall urinary functional improvement   Updated PSA 0.8         Medications     Current Outpatient Medications   Medication     amLODIPine (NORVASC) 5 MG tablet     atorvastatin (LIPITOR) 20 MG tablet     co-enzyme Q-10 100 MG CAPS capsule     eplerenone (INSPRA) 50 MG tablet     FLOVENT  MCG/ACT inhaler     fluticasone (FLONASE) 50 MCG/ACT nasal spray     Multiple Vitamins-Minerals (SUPER THERA CARLY M) TABS     NONFORMULARY     terazosin (HYTRIN) 10 MG capsule     vitamin D3 (CHOLECALCIFEROL) 125 MCG (5000 UT) tablet     amoxicillin (AMOXIL) 500 MG capsule     cephALEXin (KEFLEX) 500 MG capsule     No current facility-administered medications for this visit.         The following  distinct labs were reviewed    I personally reviewed all applicable laboratory data and went over findings with patient  Significant for:    CBC RESULTS:  Recent Labs   Lab Test 09/13/22  0608   WBC 31.2*   HGB 11.3*           BMP RESULTS:  Recent Labs   Lab Test 09/13/22  0608 09/12/22  1728 09/12/22  1633 09/12/22  0734     --   --   --    POTASSIUM 3.7  --   --   --    CHLORIDE 110*  --   --   --    CO2 29  --   --   --    ANIONGAP 4  --   --   --    * 156*  --  99   BUN 18  --   --   --    CR 0.92  --  1.04  --    GFRESTIMATED 83  --  72  --        CALCIUM RESULTS:  Recent Labs   Lab Test 09/13/22  0608   LEONA 8.6       PTH RESULTS:  No results for input(s): PTHI in the last  84462 hours.    HGB A1C RESULTS:  No results found for: A1C    UA RESULTS:   No results for input(s): SG, URINEPH, NITRITE, RBCU, WBCU in the last 64011 hours.    PSA RESULTS  PSA Tumor Marker   Date Value Ref Range Status   05/26/2023 0.84 0.00 - 4.00 ug/L Final   11/28/2022 1.53 0.00 - 4.00 ug/L Final              Assessment/Plan   83 year old year old person with hx of prostate cancer found on HoLEP specimen  -Follow up  Months with PSA prior, may do virtual visit    CC:  No Ref-Primary, Physician  *

## 2023-11-20 ENCOUNTER — TELEPHONE (OUTPATIENT)
Dept: UROLOGY | Facility: CLINIC | Age: 83
End: 2023-11-20

## 2023-11-20 ENCOUNTER — LAB (OUTPATIENT)
Dept: LAB | Facility: CLINIC | Age: 83
End: 2023-11-20
Payer: MEDICARE

## 2023-11-20 DIAGNOSIS — C61 PROSTATE CANCER (H): ICD-10-CM

## 2023-11-20 PROCEDURE — 84153 ASSAY OF PSA TOTAL: CPT

## 2023-11-20 PROCEDURE — 36415 COLL VENOUS BLD VENIPUNCTURE: CPT

## 2023-11-20 NOTE — TELEPHONE ENCOUNTER
[facility] Call Center    Phone Message    May a detailed message be left on voicemail: Yes    Reason for Call: Other: Patient called to see if he could do a phone visit on the 28th instead of video. Please call patient to follow up and leave a detailed message if need be.     Action Taken: Message routed to:  [facility] ([facility] Urology    Travel Screening: Not Applicable

## 2023-11-21 LAB — PSA SERPL DL<=0.01 NG/ML-MCNC: 1 NG/ML

## 2023-11-28 ENCOUNTER — VIRTUAL VISIT (OUTPATIENT)
Dept: UROLOGY | Facility: CLINIC | Age: 83
End: 2023-11-28
Payer: MEDICARE

## 2023-11-28 VITALS — WEIGHT: 152 LBS | BODY MASS INDEX: 23.11 KG/M2

## 2023-11-28 DIAGNOSIS — C61 PROSTATE CANCER (H): ICD-10-CM

## 2023-11-28 DIAGNOSIS — N39.3 STRESS INCONTINENCE OF URINE: Primary | ICD-10-CM

## 2023-11-28 PROCEDURE — 99213 OFFICE O/P EST LOW 20 MIN: CPT | Mod: 95 | Performed by: UROLOGY

## 2023-11-28 ASSESSMENT — PAIN SCALES - GENERAL: PAINLEVEL: NO PAIN (0)

## 2023-11-28 NOTE — LETTER
11/28/2023       RE: Kehinde Portillo  [address] [address] [facility] [address]     Dear Colleague,    Thank you for referring your patient, Kehinde Portillo, to the [facility] UROLOG[facility] CL[facility]. Please see a copy of my visit note below.          UROLOGY OUTPATIENT VISIT      Chief Complaint:   Prostate Cancer      Synopsis    Kehinde Portillo is a very pleasant AGE: 83 year old year old person  He is now about 18 months out from a HoLEP  Overall doing quite well.  Has a good stream, minimal irritative symptoms  Using one underwear liner daily.  Occasional leakage  Stream is strong, no infection symptoms    He did have low-grade prostate cancer identified in less than 5% of the tissue  We have been monitoring this with watchful waiting    PSA remains low at 1.0         Medications     Current Outpatient Medications   Medication    amLODIPine (NORVASC) 5 MG tablet    amoxicillin (AMOXIL) 500 MG capsule    atorvastatin (LIPITOR) 20 MG tablet    cephALEXin (KEFLEX) 500 MG capsule    co-enzyme Q-10 100 MG CAPS capsule    eplerenone (INSPRA) 50 MG tablet    FLOVENT  MCG/ACT inhaler    fluticasone (FLONASE) 50 MCG/ACT nasal spray    Multiple Vitamins-Minerals (SUPER THERA CARLY M) TABS    NONFORMULARY    terazosin (HYTRIN) 10 MG capsule    vitamin D3 (CHOLECALCIFEROL) 125 MCG (5000 UT) tablet     No current facility-administered medications for this visit.         The following  distinct labs were reviewed    I personally reviewed all applicable laboratory data and went over findings with patient  Significant for:    CBC RESULTS:  Recent Labs   Lab Test 09/13/22  0608   WBC 31.2*   HGB 11.3*           BMP RESULTS:  Recent Labs   Lab Test 09/13/22  0608 09/12/22  1728 09/12/22  1633 09/12/22  0734     --   --   --    POTASSIUM 3.7  --   --   --    CHLORIDE 110*  --   --   --    CO2 29  --   --   --    ANIONGAP 4  --   --    --    * 156*  --  99   BUN 18  --   --   --    CR 0.92  --  1.04  --    GFRESTIMATED 83  --  72  --        CALCIUM RESULTS:  Recent Labs   Lab Test 09/13/22  0608   LEONA 8.6     PSA RESULTS  PSA Tumor Marker   Date Value Ref Range Status   11/20/2023 1.00 ng/mL Final     Comment:     No reference ranges have been established for patients over 80 years.   05/26/2023 0.84 0.00 - 4.00 ug/L Final   11/28/2022 1.53 0.00 - 4.00 ug/L Final                Assessment/Plan   83 year old year old person with history of laser enucleation and incidental low-grade prostate cancer  -Continue every 6 month PSA checks  -We will try pelvic floor physical therapy for ongoing urinary leakage concerns    CC:  No Ref-Primary, Physician    Virtual Visit Details    Type of service:  Video Visit   Video Start Time: 10:34 AM  Video End Time: 10:45    Originating Location (pt. Location): Home    Distant Location (provider location):  On-site  Platform used for Video Visit: Sarah    Sincerely,    Isaiah Castrejon MD

## 2024-06-16 ENCOUNTER — HEALTH MAINTENANCE LETTER (OUTPATIENT)
Age: 84
End: 2024-06-16

## 2024-06-20 ENCOUNTER — THERAPY VISIT (OUTPATIENT)
Dept: PHYSICAL THERAPY | Facility: CLINIC | Age: 84
End: 2024-06-20
Payer: MEDICARE

## 2024-06-20 DIAGNOSIS — N39.3 STRESS INCONTINENCE OF URINE: ICD-10-CM

## 2024-06-20 DIAGNOSIS — N81.89 PELVIC FLOOR WEAKNESS: Primary | ICD-10-CM

## 2024-06-20 DIAGNOSIS — Z98.890 HISTORY OF PROSTATE SURGERY: ICD-10-CM

## 2024-06-20 PROBLEM — C61 PROSTATE CANCER (H): Status: ACTIVE | Noted: 2024-06-20

## 2024-06-20 PROCEDURE — 97110 THERAPEUTIC EXERCISES: CPT | Mod: GP | Performed by: PHYSICAL THERAPIST

## 2024-06-20 PROCEDURE — 97530 THERAPEUTIC ACTIVITIES: CPT | Mod: GP | Performed by: PHYSICAL THERAPIST

## 2024-06-20 PROCEDURE — 97161 PT EVAL LOW COMPLEX 20 MIN: CPT | Mod: GP | Performed by: PHYSICAL THERAPIST

## 2024-06-20 NOTE — PROGRESS NOTES
PHYSICAL THERAPY EVALUATION  Type of Visit: Evaluation       Fall Risk Screen:  Fall screen completed by: PT  Have you fallen 2 or more times in the past year?: No  Have you fallen and had an injury in the past year?: No  Is patient a fall risk?: No    Subjective       Presenting condition or subjective complaint: Patient reports having a prostate surgery(Holep) procedure in 2022 with some urinary incontinence following. He did have PFPT in 2022 however did not keep up with the exercises. Currently, he c/o daily leakage, using 2-3 pads daily. He c/o  leakage with bending, lifting and fast walking.  Date of onset: 11/28/23    Relevant medical history: Bladder or bowel problems; Incontinence (CLL)   Dates & types of surgery: prostate 2022, B TKA    Prior diagnostic imaging/testing results:       Prior therapy history for the same diagnosis, illness or injury: Yes      Prior Level of Function  Transfers:   Ambulation:   ADL:   IADL:     Living Environment  Social support: With a significant other or spouse   Type of home: House; Townhome; 2-story; Basement   Stairs to enter the home: Yes 4 Is there a railing: No     Ramp: No   Stairs inside the home: Yes 12 Is there a railing: Yes     Help at home: None  Equipment owned:       Employment: Not Applicable    Hobbies/Interests: Travel ,  outdoors    Patient goals for therapy: Whatever it takes    Pain assessment: none     Objective      PELVIC EVALUATION  ADDITIONAL HISTORY:  Sex assigned at birth: Male  Gender identity: Male    Pronouns: He/Him/His      Bladder History:  Feels bladder filling: Yes  Triggers for feeling of inability to wait to go to the bathroom: No    How long can you wait to urinate: Quite a while  Gets up at night to urinate: Yes 2 to 3  Can stop the flow of urine when urinating: Yes  Volume of urine usually released: Medium   Other issues:    Number of bladder infections in last 12 months:    Fluid intake per day: 32 16 to 20    Medications taken for  bladder: No     Activities causing urine leak: Other activities Walking, working [employer]  Amount of urine typically leaked: ?  Pads used to help with leaking: Yes I use this many pads per day: 2        Bowel History:  Frequency of bowel movement: Daily  Consistency of stool: Soft-formed    Ignores the urge to defecate: No  Other bowel issues:    Length of time spent trying to have a bowel movement:      Sexual Function History:  Sexual orientation: Straight    Sexually active: Yes  Lubrication used: Yes Yes  Pelvic pain:      Pain or difficulty with orgasms/erection/ejaculation: No    State of menopause:    Hormone medications:        Do you get regular exercise: Yes  I do this type of exercise: walk      Discussed reason for referral regarding pelvic health needs and external/internal pelvic floor muscle examination with patient/guardian.  Opportunity provided to ask questions and verbal consent for assessment and intervention was given.    PAIN: none  POSTURE:   LUMBAR SCREEN:   HIP SCREEN:  Strength:    Functional Strength Testing:     PELVIC/SI SCREEN:     PAIN PROVOCATION TEST:   PELVIS/SI SPECIAL TESTS:   BREATHING SYMMETRY:     PELVIC EXAM  External Visual Inspection:  At rest: Normal  With voluntary pelvic floor contraction: Present  Relaxation of PFM: Yes  With intra-abdominal pressure: Bearing down as defecation: Perineal descent  Endurance: 8 sec hold  Substitution: gluteals  Integumentary:   Anal: unremarkable    External Digital Palpation per Perineum:       Scar:   Location/Type:   Mobility:     Internal Digital Palpation:  Per Vagina:      Per Rectum:        Pelvic Organ Prolapse:       ABDOMINAL ASSESSMENT  Diastasis Rectus Abdominis (EDWIN):  EDWIN presence: No    Abdominal Activation/Strength:     Scar:   Location/Type:   Mobility:     Fascial Tension/Restriction:     BIOFEEDBACK:  Position:   Surface Electrodes:     Abdominals:     Perianals:       DERMATOMES:   DTR S:     Assessment & Plan    CLINICAL IMPRESSIONS  Medical Diagnosis: stress incontinence of urine    Treatment Diagnosis: stress incontinence, PF weakness, history of prostate surgery   Impression/Assessment: Patient is a 84 year old male with stress urinary incontinence complaints.  The following significant findings have been identified: Decreased strength and Impaired muscle performance. These impairments interfere with their ability to perform self care tasks, recreational activities, and household chores as compared to previous level of function.     Clinical Decision Making (Complexity):  Clinical Presentation: Stable/Uncomplicated  Clinical Presentation Rationale: based on medical and personal factors listed in PT evaluation  Clinical Decision Making (Complexity): Low complexity    PLAN OF CARE  Treatment Interventions:  Interventions: Neuromuscular Re-education, Therapeutic Activity, Therapeutic Exercise, Self-Care/Home Management    Long Term Goals     PT Goal 1  Goal Identifier: urinary incontinence/pads  Goal Description: Decrease incontinence episodes to 1-2x per week / 1 thin pad daily- mostly dry (baseline: daily leakage / 1-3 maxipads daily)  Rationale: to maximize safety and independence with performance of ADLs and functional tasks;to maximize safety and independence with self cares  Target Date: 09/12/24      Frequency of Treatment: 1x week every other week  Duration of Treatment: 12 weeks    Recommended Referrals to Other Professionals: Physical Therapy  Education Assessment:   Learner/Method: Patient;Listening;Pictures/Video    Risks and benefits of evaluation/treatment have been explained.   Patient/Family/caregiver agrees with Plan of Care.     Evaluation Time:     PT Eval, Low Complexity Minutes (36839): 25       Signing Clinician: Lolis Bueno PT        [facility]                                                                                   OUTPATIENT PHYSICAL THERAPY      PLAN OF  TREATMENT FOR OUTPATIENT REHABILITATION   Patient's Last Name, First Name, Kehinde Aldridge YOB: 1940   Provider's Name   [facility]   Medical Record No.  [id number]     Onset Date: 11/28/23  Start of Care Date: 06/20/24     Medical Diagnosis:  stress incontinence of urine      PT Treatment Diagnosis:  stress incontinence, PF weakness, history of prostate surgery Plan of Treatment  Frequency/Duration: 1x week every other week/ 12 weeks    Certification date from 06/20/24 to 09/12/24         See note for plan of treatment details and functional goals     Lolis Bueno, PT                         I CERTIFY THE NEED FOR THESE SERVICES FURNISHED UNDER        THIS PLAN OF TREATMENT AND WHILE UNDER MY CARE     (Physician attestation of this document indicates review and certification of the therapy plan).              Referring Provider:  Isaiah Castrejon    Initial Assessment  See Epic Evaluation- Start of Care Date: 06/20/24

## 2024-08-12 PROBLEM — Z98.890 HISTORY OF PROSTATE SURGERY: Status: RESOLVED | Noted: 2024-06-20 | Resolved: 2024-08-12

## 2024-08-12 PROBLEM — N81.89 PELVIC FLOOR WEAKNESS: Status: RESOLVED | Noted: 2024-06-20 | Resolved: 2024-08-12

## 2024-08-12 PROBLEM — N39.3 STRESS INCONTINENCE OF URINE: Status: RESOLVED | Noted: 2024-06-20 | Resolved: 2024-08-12

## 2025-04-22 ENCOUNTER — TELEPHONE (OUTPATIENT)
Dept: UROLOGY | Facility: CLINIC | Age: 85
End: 2025-04-22
Payer: MEDICARE

## 2025-04-22 NOTE — TELEPHONE ENCOUNTER
Spoke with patient to reschedule his appt with  on 6/24, he was driving and asked for a myc message to call back to r/s.       Sent myc message.

## 2025-06-21 ENCOUNTER — HEALTH MAINTENANCE LETTER (OUTPATIENT)
Age: 85
End: 2025-06-21

## 2025-07-07 ENCOUNTER — TELEPHONE (OUTPATIENT)
Dept: UROLOGY | Facility: CLINIC | Age: 85
End: 2025-07-07
Payer: MEDICARE

## 2025-07-07 DIAGNOSIS — C61 PROSTATE CANCER (H): Primary | ICD-10-CM

## 2025-07-07 NOTE — TELEPHONE ENCOUNTER
Patient calling in to ask if we can fax PSA order to [facility]   Fax# [phone]  Ph # [phone]  Order faxed 7/7/25 10:17am GRACIE     Patient has a upcoming VV with Dr Castrejon on 7/15/25

## 2025-07-07 NOTE — TELEPHONE ENCOUNTER
[facility] Call Center    Phone Message    May a detailed message be left on voicemail: no    Reason for Call: Other: lab needs PSA lab orders, please fax over   Pt has appt at 1020 am  Action Taken: Other: urology    Travel Screening: Not Applicable     Date of Service:

## 2025-07-09 NOTE — TELEPHONE ENCOUNTER
Reason for visit: Follow up: 6 month PSA recheck, discuss results of pelvic floor physical therapy for ongoing urinary leakage concerns    Relevant information: PSA resulted as of 7/8/25    Records/imaging/labs/orders: [facility], CARE EVERYWHERE, AND PACS     At Rooming:   KIMI/NATALIA assigned during pre-visit     Irish Preston  7/9/2025  10:57 AM

## 2025-07-15 ENCOUNTER — VIRTUAL VISIT (OUTPATIENT)
Dept: UROLOGY | Facility: CLINIC | Age: 85
End: 2025-07-15
Payer: MEDICARE

## 2025-07-15 ENCOUNTER — PRE VISIT (OUTPATIENT)
Dept: UROLOGY | Facility: CLINIC | Age: 85
End: 2025-07-15

## 2025-07-15 DIAGNOSIS — N40.0 ENLARGED PROSTATE: Primary | ICD-10-CM

## 2025-07-15 PROCEDURE — 98005 SYNCH AUDIO-VIDEO EST LOW 20: CPT | Performed by: UROLOGY

## 2025-07-15 PROCEDURE — 1126F AMNT PAIN NOTED NONE PRSNT: CPT | Performed by: UROLOGY

## 2025-07-15 NOTE — LETTER
7/15/2025       RE: Kehinde Portillo  [address] [place] [address]     Dear Colleague,    Thank you for referring your patient, Kehinde Portillo, to the [facility]. Please see a copy of my visit note below.    Virtual Visit Details    Type of service:  Video Visit   Video Start Time: 12:00  Video End Time:12:15    Originating Location (pt. 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  - Attended physical therapy in the past for urinary control issues, did not follow up but reports recent improvement  - Reports generally good urinary flow, occasional issues but overall satisfied with bladder emptying  - Able to hold urine until reaching restroom  - Denies blood in urine, urinary infections, unintentional weight loss, or new bone/joint pain  - Taking terazosin for blood pressure, reports blood pressure has been running low  -LAst PSA 7/2025 0.83    Assessment & Plan  Enlarged prostate:  - Post-surgery PSA level is 0.83, indicating a low likelihood of aggressive prostate cancer. Previous biopsy showed Beaver 6 prostate cancer, the lowest grade possible.  - Continue monitoring PSA levels on an annual basis.    Low blood pressure:  - Blood pressure has been coming in rather low.  - Discuss with primary care doctor about potentially stopping terazosin, especially if blood pressure remains low. Consider holding the medication for a couple of days and repeating blood pressure measurements.  - Risks and side effects: terazosin can cause dizziness if blood pressure is low.          Again, thank you for allowing me to participate in the care of your patient.       Sincerely,    Isaiah Castrejon MD

## 2025-07-15 NOTE — NURSING NOTE
Current patient location: [place]    Is the patient currently in the state of [place]? YES    Visit mode: VIDEO    If the visit is dropped, the patient can be reconnected by:VIDEO VISIT: Text to cell phone:   Telephone Information:   Mobile [phone]       Will anyone else be joining the visit? NO  (If patient encounters technical issues they should call [phone]    Are changes needed to the allergy or medication list? No also taking brinkisa    Are refills needed on medications prescribed by this physician? NO    Rooming Documentation:  Questionnaire(s) completed    Reason for visit: RECHECK    Isaura MOSLEY

## 2025-07-15 NOTE — PROGRESS NOTES
Virtual Visit Details    Type of service:  Video Visit   Video Start Time: 12:00  Video End Time:12:15    Originating Location (pt. Location): Home    Distant Location (provider location):  Off-site  Platform used for Video Visit: Sarah    History of Present Illness-  Heber Portillo, 85-year-old male  - Underwent HoLEP (prostate surgery) a couple of years ago  - History of trace prostate cancer, Александр 6, found on biopsy  - Attended physical therapy in the past for urinary control issues, did not follow up but reports recent improvement  - Reports generally good urinary flow, occasional issues but overall satisfied with bladder emptying  - Able to hold urine until reaching restroom  - Denies blood in urine, urinary infections, unintentional weight loss, or new bone/joint pain  - Taking terazosin for blood pressure, reports blood pressure has been running low  -LAst PSA 7/2025 0.83    Assessment & Plan  Enlarged prostate:  - Post-surgery PSA level is 0.83, indicating a low likelihood of aggressive prostate cancer. Previous biopsy showed Александр 6 prostate cancer, the lowest grade possible.  - Continue monitoring PSA levels on an annual basis.    Low blood pressure:  - Blood pressure has been coming in rather low.  - Discuss with primary care doctor about potentially stopping terazosin, especially if blood pressure remains low. Consider holding the medication for a couple of days and repeating blood pressure measurements.  - Risks and side effects: terazosin can cause dizziness if blood pressure is low.

## (undated) DEVICE — TUBING SUCTION MEDI-VAC 1/4"X20' N620A

## (undated) DEVICE — DRSG GAUZE 4X8" NON21842

## (undated) DEVICE — SYR PISTON IRRIGATION 60 ML DYND20325

## (undated) DEVICE — TUBING SET PIRANHA 41702208

## (undated) DEVICE — SYR 50ML LL W/O NDL 309653

## (undated) DEVICE — Device

## (undated) DEVICE — SOL WATER IRRIG 1000ML BOTTLE 2F7114

## (undated) DEVICE — BLADE MORCELLATOR WOLF PIRANHA 4.75X385MM 49700103

## (undated) DEVICE — TAPE DURAPORE 3" SILK 1538-3

## (undated) DEVICE — CATH FOLEY 3WAY 22FR 30ML LATEX 0167SI22

## (undated) DEVICE — SUCTION WATERBUG FLOOR PUDDLE VAC 9321

## (undated) DEVICE — SOL NACL 0.9% IRRIG 3000ML BAG 2B7477

## (undated) DEVICE — DRSG ABDOMINAL 07 1/2X8" 7197D

## (undated) DEVICE — SPECIMEN TRAP TISSUE CONTAINER PIRANHA 2208120

## (undated) DEVICE — BAG URINARY DRAIN 4000ML LF 153509

## (undated) DEVICE — FILTER PIRANHA DISP 2228.901

## (undated) DEVICE — TUBING IRRIG CYSTO/BLADDER SET 81" LF 2C4040

## (undated) DEVICE — LASER FIBER HOLMIUM MOSES 550 D/F/L AC-10030120

## (undated) DEVICE — LIGHT HANDLE X2

## (undated) DEVICE — DRAPE MAYO STAND 23X54 8337

## (undated) DEVICE — LINEN GOWN X4 5410

## (undated) DEVICE — GLOVE PROTEXIS W/NEU-THERA 7.5  2D73TE75

## (undated) DEVICE — TUBING SET THERMEDX UROLOGY SGL USE LL0006

## (undated) DEVICE — SUCTION MANIFOLD NEPTUNE 2 SYS 4 PORT 0702-020-000

## (undated) DEVICE — STRAP KNEE/BODY 31143004

## (undated) DEVICE — LINEN TOWEL PACK X5 5464

## (undated) DEVICE — PAD CHUX UNDERPAD 30X36" P3036C

## (undated) DEVICE — CATH LASER URETERAL 7.1FRX40CM G17797  022403-7.1-40

## (undated) DEVICE — SOL NACL 0.9% IRRIG 1000ML BOTTLE 2F7124

## (undated) RX ORDER — FENTANYL CITRATE 50 UG/ML
INJECTION, SOLUTION INTRAMUSCULAR; INTRAVENOUS
Status: DISPENSED
Start: 2022-09-12

## (undated) RX ORDER — EPHEDRINE SULFATE 50 MG/ML
INJECTION, SOLUTION INTRAMUSCULAR; INTRAVENOUS; SUBCUTANEOUS
Status: DISPENSED
Start: 2022-09-12

## (undated) RX ORDER — PROPOFOL 10 MG/ML
INJECTION, EMULSION INTRAVENOUS
Status: DISPENSED
Start: 2022-09-12

## (undated) RX ORDER — ONDANSETRON 2 MG/ML
INJECTION INTRAMUSCULAR; INTRAVENOUS
Status: DISPENSED
Start: 2022-09-12

## (undated) RX ORDER — FUROSEMIDE 10 MG/ML
INJECTION INTRAMUSCULAR; INTRAVENOUS
Status: DISPENSED
Start: 2022-09-12

## (undated) RX ORDER — VANCOMYCIN HYDROCHLORIDE 1 G/200ML
INJECTION, SOLUTION INTRAVENOUS
Status: DISPENSED
Start: 2022-09-12

## (undated) RX ORDER — DEXAMETHASONE SODIUM PHOSPHATE 4 MG/ML
INJECTION, SOLUTION INTRA-ARTICULAR; INTRALESIONAL; INTRAMUSCULAR; INTRAVENOUS; SOFT TISSUE
Status: DISPENSED
Start: 2022-09-12

## (undated) RX ORDER — ACETAMINOPHEN 325 MG/1
TABLET ORAL
Status: DISPENSED
Start: 2022-09-12

## (undated) RX ORDER — LIDOCAINE HYDROCHLORIDE 20 MG/ML
INJECTION, SOLUTION EPIDURAL; INFILTRATION; INTRACAUDAL; PERINEURAL
Status: DISPENSED
Start: 2022-09-12